# Patient Record
Sex: MALE | Race: WHITE | NOT HISPANIC OR LATINO | Employment: OTHER | URBAN - METROPOLITAN AREA
[De-identification: names, ages, dates, MRNs, and addresses within clinical notes are randomized per-mention and may not be internally consistent; named-entity substitution may affect disease eponyms.]

---

## 2018-08-13 ENCOUNTER — OFFICE VISIT (OUTPATIENT)
Dept: INTERNAL MEDICINE | Facility: CLINIC | Age: 68
End: 2018-08-13

## 2018-08-13 VITALS
OXYGEN SATURATION: 99 % | DIASTOLIC BLOOD PRESSURE: 84 MMHG | SYSTOLIC BLOOD PRESSURE: 130 MMHG | HEART RATE: 84 BPM | WEIGHT: 180.8 LBS

## 2018-08-13 DIAGNOSIS — I10 ESSENTIAL HYPERTENSION: Primary | ICD-10-CM

## 2018-08-13 DIAGNOSIS — H91.93 BILATERAL HEARING LOSS, UNSPECIFIED HEARING LOSS TYPE: ICD-10-CM

## 2018-08-13 PROCEDURE — 90670 PCV13 VACCINE IM: CPT | Performed by: INTERNAL MEDICINE

## 2018-08-13 PROCEDURE — 99203 OFFICE O/P NEW LOW 30 MIN: CPT | Performed by: INTERNAL MEDICINE

## 2018-08-13 PROCEDURE — 90471 IMMUNIZATION ADMIN: CPT | Performed by: INTERNAL MEDICINE

## 2018-08-13 RX ORDER — VALSARTAN 80 MG/1
320 TABLET ORAL DAILY
COMMUNITY
End: 2018-08-13

## 2018-08-13 RX ORDER — HYDROCHLOROTHIAZIDE 25 MG/1
12.5 TABLET ORAL DAILY
COMMUNITY
End: 2019-02-04

## 2018-08-13 RX ORDER — IRBESARTAN AND HYDROCHLOROTHIAZIDE 300; 12.5 MG/1; MG/1
1 TABLET, FILM COATED ORAL DAILY
Qty: 30 TABLET | Refills: 5 | Status: SHIPPED | OUTPATIENT
Start: 2018-08-13 | End: 2019-01-09 | Stop reason: SDUPTHER

## 2018-08-13 NOTE — PROGRESS NOTES
Establish Care (Just [place] another state )    Subjective   Balta De La O is a 68 y.o. male is here today to [facility] .    History of Present Illness   Moved here from [place], after he retired . Lived there for 33 years.  Concerns re; meds.   On valatn, did not receive any recall letter.    Current Outpatient Prescriptions:   •  hydrochlorothiazide (HYDRODIURIL) 25 MG tablet, Take 12.5 mg by mouth Daily., Disp: , Rfl:   •  irbesartan-hydrochlorothiazide (AVALIDE) 300-12.5 MG tablet, Take 1 tablet by mouth Daily., Disp: 30 tablet, Rfl: 5      The following portions of the patient's history were reviewed and updated as appropriate: allergies, current medications, past family history, past medical history, past social history, past surgical history and problem list.    Review of Systems   Constitutional: Negative.  Negative for chills and fever.   HENT: Negative for ear discharge, ear pain, sinus pressure and sore throat.    Respiratory: Negative for cough, chest tightness and shortness of breath.    Cardiovascular: Negative for chest pain, palpitations and leg swelling.   Gastrointestinal: Negative for diarrhea, nausea and vomiting.   Musculoskeletal: Negative for arthralgias, back pain and myalgias.   Neurological: Negative for dizziness, syncope and headaches.   Psychiatric/Behavioral: Negative for confusion and sleep disturbance.       Objective   /84   Pulse 84   Wt 82 kg (180 lb 12.8 oz)   SpO2 99%   Physical Exam   Constitutional: He is oriented to person, place, and time. He appears well-developed and well-nourished.   HENT:   Head: Normocephalic and atraumatic.   Mouth/Throat: No oropharyngeal exudate.   Eyes: Pupils are equal, round, and reactive to light. Conjunctivae are normal.   Neck: Neck supple. No thyromegaly present.   Cardiovascular: Normal rate, regular rhythm and intact distal pulses.    Pulmonary/Chest: Effort normal and breath sounds normal.   Abdominal: Soft. Bowel sounds are  normal. He exhibits no distension. There is no tenderness.   Musculoskeletal: He exhibits no edema.   Neurological: He is alert and oriented to person, place, and time. No cranial nerve deficit.   Skin: Skin is warm and dry.   Psychiatric: He has a normal mood and affect. Judgment normal.   Nursing note and vitals reviewed.        No results found for this or any previous visit.          Assessment/Plan   Diagnoses and all orders for this visit:    Essential hypertension  -     irbesartan-hydrochlorothiazide (AVALIDE) 300-12.5 MG tablet; Take 1 tablet by mouth Daily.    Bilateral hearing loss, unspecified hearing loss type  -     Ambulatory Referral to Audiology    Other orders  -     Discontinue: valsartan (DIOVAN) 80 MG tablet; Take 320 mg by mouth Daily.  -     hydrochlorothiazide (HYDRODIURIL) 25 MG tablet; Take 12.5 mg by mouth Daily.  -     Pneumococcal Conjugate Vaccine 13-Valent All    hold plain hctz and add if needed.  Start avalide.             Return in about 6 weeks (around 9/24/2018) for Next scheduled follow up.

## 2018-09-04 ENCOUNTER — TELEPHONE (OUTPATIENT)
Dept: INTERNAL MEDICINE | Facility: CLINIC | Age: 68
End: 2018-09-04

## 2018-09-04 NOTE — TELEPHONE ENCOUNTER
PATIENT WOULD LIKE HIS AUDIOLOGY REFERRAL TO BE WITH [facility] IN [place]

## 2018-09-18 ENCOUNTER — OFFICE VISIT (OUTPATIENT)
Dept: INTERNAL MEDICINE | Facility: CLINIC | Age: 68
End: 2018-09-18

## 2018-09-18 VITALS
BODY MASS INDEX: 26.51 KG/M2 | RESPIRATION RATE: 16 BRPM | SYSTOLIC BLOOD PRESSURE: 128 MMHG | HEART RATE: 71 BPM | DIASTOLIC BLOOD PRESSURE: 80 MMHG | WEIGHT: 179 LBS | HEIGHT: 69 IN | OXYGEN SATURATION: 98 %

## 2018-09-18 DIAGNOSIS — E78.5 DYSLIPIDEMIA: ICD-10-CM

## 2018-09-18 DIAGNOSIS — I10 ESSENTIAL HYPERTENSION: Primary | ICD-10-CM

## 2018-09-18 DIAGNOSIS — E55.9 VITAMIN D DEFICIENCY: ICD-10-CM

## 2018-09-18 LAB
25(OH)D3 SERPL-MCNC: 20.8 NG/ML
ALBUMIN SERPL-MCNC: 4.31 G/DL (ref 3.2–4.8)
ALBUMIN/GLOB SERPL: 1.7 G/DL (ref 1.5–2.5)
ALP SERPL-CCNC: 75 U/L (ref 25–100)
ALT SERPL W P-5'-P-CCNC: 19 U/L (ref 7–40)
ANION GAP SERPL CALCULATED.3IONS-SCNC: 12 MMOL/L (ref 3–11)
ARTICHOKE IGE QN: 112 MG/DL (ref 0–130)
AST SERPL-CCNC: 22 U/L (ref 0–33)
BILIRUB SERPL-MCNC: 0.7 MG/DL (ref 0.3–1.2)
BUN BLD-MCNC: 13 MG/DL (ref 9–23)
BUN/CREAT SERPL: 14.8 (ref 7–25)
CALCIUM SPEC-SCNC: 8.9 MG/DL (ref 8.7–10.4)
CHLORIDE SERPL-SCNC: 103 MMOL/L (ref 99–109)
CHOLEST SERPL-MCNC: 179 MG/DL (ref 0–200)
CO2 SERPL-SCNC: 26 MMOL/L (ref 20–31)
CREAT BLD-MCNC: 0.88 MG/DL (ref 0.6–1.3)
GFR SERPL CREATININE-BSD FRML MDRD: 87 ML/MIN/1.73
GLOBULIN UR ELPH-MCNC: 2.5 GM/DL
GLUCOSE BLD-MCNC: 76 MG/DL (ref 70–100)
HDLC SERPL-MCNC: 43 MG/DL (ref 40–60)
POTASSIUM BLD-SCNC: 3.9 MMOL/L (ref 3.5–5.5)
PROT SERPL-MCNC: 6.8 G/DL (ref 5.7–8.2)
SODIUM BLD-SCNC: 141 MMOL/L (ref 132–146)
TRIGL SERPL-MCNC: 220 MG/DL (ref 0–150)
TSH SERPL DL<=0.05 MIU/L-ACNC: 0.71 MIU/ML (ref 0.35–5.35)

## 2018-09-18 PROCEDURE — 82306 VITAMIN D 25 HYDROXY: CPT | Performed by: INTERNAL MEDICINE

## 2018-09-18 PROCEDURE — 84443 ASSAY THYROID STIM HORMONE: CPT | Performed by: INTERNAL MEDICINE

## 2018-09-18 PROCEDURE — 80061 LIPID PANEL: CPT | Performed by: INTERNAL MEDICINE

## 2018-09-18 PROCEDURE — 99213 OFFICE O/P EST LOW 20 MIN: CPT | Performed by: INTERNAL MEDICINE

## 2018-09-18 PROCEDURE — 80053 COMPREHEN METABOLIC PANEL: CPT | Performed by: INTERNAL MEDICINE

## 2018-09-18 NOTE — PROGRESS NOTES
"Hypertension (6 wk fu)    Subjective   Kendell De La O is a 68 y.o. male is here today for follow-up.    History of Present Illness   Here for a follow up on his HTN,started on Avalide at last visit and dlp. Fasting for labs- this afternoon.  Denies any side effects on the avalide.  HAs not been monitoring his BP.    Current Outpatient Prescriptions:   •  hydrochlorothiazide (HYDRODIURIL) 25 MG tablet, Take 12.5 mg by mouth Daily., Disp: , Rfl:   •  irbesartan-hydrochlorothiazide (AVALIDE) 300-12.5 MG tablet, Take 1 tablet by mouth Daily., Disp: 30 tablet, Rfl: 5      The following portions of the patient's history were reviewed and updated as appropriate: allergies, current medications, past family history, past medical history, past social history, past surgical history and problem list.    Review of Systems   Constitutional: Negative.  Negative for chills and fever.   HENT: Negative for ear discharge, ear pain, sinus pressure and sore throat.    Respiratory: Negative for cough, chest tightness and shortness of breath.    Cardiovascular: Negative for chest pain, palpitations and leg swelling.   Gastrointestinal: Negative for diarrhea, nausea and vomiting.   Musculoskeletal: Negative for arthralgias, back pain and myalgias.   Neurological: Negative for dizziness, syncope and headaches.   Psychiatric/Behavioral: Negative for confusion and sleep disturbance.       Objective   /80   Pulse 71   Resp 16   Ht 175.3 cm (69\")   Wt 81.2 kg (179 lb)   SpO2 98%   BMI 26.43 kg/m²   Physical Exam   Constitutional: He is oriented to person, place, and time. He appears well-developed and well-nourished.   HENT:   Head: Normocephalic and atraumatic.   Mouth/Throat: No oropharyngeal exudate.   Eyes: Pupils are equal, round, and reactive to light. Conjunctivae are normal.   Neck: Neck supple. No thyromegaly present.   Cardiovascular: Normal rate and regular rhythm.    Pulmonary/Chest: Effort normal and breath sounds " normal.   Abdominal: Soft. Bowel sounds are normal. He exhibits no distension. There is no tenderness.   Musculoskeletal: He exhibits no edema.   Neurological: He is alert and oriented to person, place, and time. No cranial nerve deficit.   Skin: Skin is warm and dry.   Psychiatric: He has a normal mood and affect. Judgment normal.   Nursing note and vitals reviewed.        Results for orders placed or performed in visit on 09/18/18   Comprehensive Metabolic Panel   Result Value Ref Range    Glucose 76 70 - 100 mg/dL    BUN 13 9 - 23 mg/dL    Creatinine 0.88 0.60 - 1.30 mg/dL    Sodium 141 132 - 146 mmol/L    Potassium 3.9 3.5 - 5.5 mmol/L    Chloride 103 99 - 109 mmol/L    CO2 26.0 20.0 - 31.0 mmol/L    Calcium 8.9 8.7 - 10.4 mg/dL    Total Protein 6.8 5.7 - 8.2 g/dL    Albumin 4.31 3.20 - 4.80 g/dL    ALT (SGPT) 19 7 - 40 U/L    AST (SGOT) 22 0 - 33 U/L    Alkaline Phosphatase 75 25 - 100 U/L    Total Bilirubin 0.7 0.3 - 1.2 mg/dL    eGFR Non African Amer 87 >60 mL/min/1.73    Globulin 2.5 gm/dL    A/G Ratio 1.7 1.5 - 2.5 g/dL    BUN/Creatinine Ratio 14.8 7.0 - 25.0    Anion Gap 12.0 (H) 3.0 - 11.0 mmol/L   Lipid Panel   Result Value Ref Range    Total Cholesterol 179 0 - 200 mg/dL    Triglycerides 220 (H) 0 - 150 mg/dL    HDL Cholesterol 43 40 - 60 mg/dL    LDL Cholesterol  112 0 - 130 mg/dL   TSH   Result Value Ref Range    TSH 0.712 0.350 - 5.350 mIU/mL   Vitamin D 25 Hydroxy   Result Value Ref Range    25 Hydroxy, Vitamin D 20.8 ng/ml             Assessment/Plan   Diagnoses and all orders for this visit:    Essential hypertension  -     Comprehensive Metabolic Panel    Dyslipidemia  -     Lipid Panel  -     TSH    Vitamin D deficiency  -     Vitamin D 25 Hydroxy      Check fasting labs.  Continue avalide at current dose.           Return in about 6 months (around 3/18/2019) for Medicare Wellness.

## 2019-01-09 ENCOUNTER — TELEPHONE (OUTPATIENT)
Dept: INTERNAL MEDICINE | Facility: CLINIC | Age: 69
End: 2019-01-09

## 2019-01-09 DIAGNOSIS — I10 ESSENTIAL HYPERTENSION: ICD-10-CM

## 2019-01-09 RX ORDER — IRBESARTAN AND HYDROCHLOROTHIAZIDE 300; 12.5 MG/1; MG/1
TABLET, FILM COATED ORAL
Qty: 30 TABLET | Refills: 4 | Status: SHIPPED | OUTPATIENT
Start: 2019-01-09 | End: 2019-01-09 | Stop reason: SDUPTHER

## 2019-01-09 RX ORDER — IRBESARTAN AND HYDROCHLOROTHIAZIDE 300; 12.5 MG/1; MG/1
1 TABLET, FILM COATED ORAL DAILY
Qty: 90 TABLET | Refills: 1 | Status: SHIPPED | OUTPATIENT
Start: 2019-01-09 | End: 2019-02-04

## 2019-02-04 ENCOUNTER — OFFICE VISIT (OUTPATIENT)
Dept: FAMILY MEDICINE CLINIC | Facility: CLINIC | Age: 69
End: 2019-02-04

## 2019-02-04 VITALS
BODY MASS INDEX: 27.7 KG/M2 | HEART RATE: 72 BPM | SYSTOLIC BLOOD PRESSURE: 134 MMHG | OXYGEN SATURATION: 99 % | DIASTOLIC BLOOD PRESSURE: 82 MMHG | HEIGHT: 69 IN | WEIGHT: 187 LBS

## 2019-02-04 DIAGNOSIS — Z12.5 SCREENING FOR PROSTATE CANCER: ICD-10-CM

## 2019-02-04 DIAGNOSIS — Z23 NEED FOR TDAP VACCINATION: ICD-10-CM

## 2019-02-04 DIAGNOSIS — Z11.59 NEED FOR HEPATITIS C SCREENING TEST: ICD-10-CM

## 2019-02-04 DIAGNOSIS — Z13.220 SCREENING FOR LIPOID DISORDERS: ICD-10-CM
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
## 2019-02-04 DIAGNOSIS — K64.0 GRADE I HEMORRHOIDS: ICD-10-CM

## 2019-02-04 DIAGNOSIS — I10 ESSENTIAL HYPERTENSION: Primary | ICD-10-CM

## 2019-02-04 LAB
ARTICHOKE IGE QN: 116 MG/DL (ref 0–130)
CHOLEST SERPL-MCNC: 190 MG/DL (ref 0–200)
HCV AB SER DONR QL: NORMAL
HDLC SERPL-MCNC: 38 MG/DL (ref 40–60)
PSA SERPL-MCNC: 0.45 NG/ML (ref 0–4)
TRIGL SERPL-MCNC: 595 MG/DL (ref 0–150)

## 2019-02-04 PROCEDURE — G0103 PSA SCREENING: HCPCS | Performed by: NURSE PRACTITIONER

## 2019-02-04 PROCEDURE — 80061 LIPID PANEL: CPT | Performed by: NURSE PRACTITIONER

## 2019-02-04 PROCEDURE — 36415 COLL VENOUS BLD VENIPUNCTURE: CPT | Performed by: NURSE PRACTITIONER

## 2019-02-04 PROCEDURE — 86803 HEPATITIS C AB TEST: CPT | Performed by: NURSE PRACTITIONER

## 2019-02-04 PROCEDURE — 99203 OFFICE O/P NEW LOW 30 MIN: CPT | Performed by: NURSE PRACTITIONER

## 2019-02-04 PROCEDURE — 90715 TDAP VACCINE 7 YRS/> IM: CPT | Performed by: NURSE PRACTITIONER

## 2019-02-04 PROCEDURE — 90471 IMMUNIZATION ADMIN: CPT | Performed by: NURSE PRACTITIONER

## 2019-02-04 RX ORDER — LOSARTAN POTASSIUM AND HYDROCHLOROTHIAZIDE 25; 100 MG/1; MG/1
1 TABLET ORAL DAILY
Qty: 90 TABLET | Refills: 1 | Status: SHIPPED | OUTPATIENT
Start: 2019-02-04 | End: 2019-09-26

## 2019-02-04 RX ORDER — POLYETHYLENE GLYCOL 3350 17 G/17G
17 POWDER, FOR SOLUTION ORAL DAILY
Qty: 100 EACH | Refills: 11 | Status: SHIPPED | OUTPATIENT
Start: 2019-02-04 | End: 2019-09-26

## 2019-02-04 NOTE — PROGRESS NOTES
Kendell De La O presents today to establish care.    Chief Complaint   Patient presents with   • Establish Care   • Hypertension        HPI   Kendell presents to establish care. Recently moved from [place], [place].  Recently he received a letter saying his blood pressure medicine was recalled.    Hypertension  Does not check at home. This is a chronic issue well controlled with BP meds but recalled. Denies chest pain or tightness, diaphoresis, headache with visual changes, shortness of breath, or change in mental status.    Hemorrhoids  Reports use of Preparation H twice daily.  Without Preparation H he strains to void and has internal hemorrhoids.    Dog bite  Bit by friends dog last week, broke skin but did not seek medical care until now. No s/s of infection. Dog did not have rabies to his knowledge.    H/O melanoma  [facility] skin clinic, Tammie squires MD Dermatologist. Would like to remain pt in [place] D[facility] even though he no longer lives there.    Health Maintenance:  -Sees dentist and opthalmology regularly.  -Sexually active with spouse.  -Use of seatbelt 100% of the time  -Smoke/CO detector working in home  -Nonsmoker  -Vaccines: needs Tdap (will receive today due to dog bite) and Shingrix. He denies Shingrix because insurance does not cover.  -Other providers: Tammie Squires, Dermatologist in [place]. He would like to retain Derm in [place] because he is pleased with the care he receives.    Review of Systems   Constitutional: Negative for activity change, fatigue, unexpected weight gain and unexpected weight loss.   HENT: Positive for hearing loss (hearing aid on right side). Negative for congestion.    Eyes: Negative for blurred vision (wears glasses) and visual disturbance.   Respiratory: Negative for chest tightness, shortness of breath and wheezing.    Cardiovascular: Negative for chest pain, palpitations and leg swelling.   Gastrointestinal: Positive for anal bleeding (intermittent due to hemorrhoids). Negative for  blood in stool, constipation, diarrhea, nausea, vomiting and GERD.   Endocrine: Negative for cold intolerance and heat intolerance.   Genitourinary: Negative for frequency, hematuria, nocturia and erectile dysfunction.   Musculoskeletal: Negative for arthralgias.   Skin: Negative for color change.   Allergic/Immunologic: Negative for immunocompromised state.   Neurological: Negative for dizziness, light-headedness, headache and confusion.   Hematological: Does not bruise/bleed easily.   Psychiatric/Behavioral: Negative for depressed mood and stress. The patient is not nervous/anxious.         Past Medical History:   Diagnosis Date   • Hypertension    • Kidney stones    • Melanoma (CMS/HCC)         Past Surgical History:   Procedure Laterality Date   • SKIN CANCER EXCISION          Family History   Problem Relation Age of Onset   • Liver disease Father    • Hypertension Mother         Social History     Socioeconomic History   • Marital status:      Spouse name: Not on file   • Number of children: 2   • Years of education: Not on file   • Highest education level: Not on file   Social Needs   • Financial resource strain: Not on file   • Food insecurity - worry: Not on file   • Food insecurity - inability: Not on file   • Transportation needs - medical: Not on file   • Transportation needs - non-medical: Not on file   Occupational History   • Not on file   Tobacco Use   • Smoking status: Never Smoker   • Smokeless tobacco: Never Used   Substance and Sexual Activity   • Alcohol use: No   • Drug use: No   • Sexual activity: Yes     Partners: Female     Birth control/protection: None   Other Topics Concern   • Not on file   Social History Narrative   • Not on file        Current Outpatient Medications   Medication Sig Dispense Refill   • phenylephrine-mineral oil-petrolatum 0.25-14-74.9 % ointment hemorrhoidal ointment Insert  into the rectum.     • docusate sodium (COLACE) 50 MG capsule Take 2 capsules by mouth 3  "(Three) Times a Day As Needed for Constipation. 90 capsule 2   • hydrocortisone (ANUSOL-HC) 2.5 % rectal cream Insert  into the rectum 2 (Two) Times a Day. 28.35 g 11   • losartan-hydrochlorothiazide (HYZAAR) 100-25 MG per tablet Take 1 tablet by mouth Daily. 90 tablet 1   • polyethylene glycol (MIRALAX) packet Take 17 g by mouth Daily. 100 each 11     No current facility-administered medications for this visit.        Allergies   Allergen Reactions   • Penicillins Rash        Visit Vitals  /82 (BP Location: Left arm, Patient Position: Sitting, Cuff Size: Adult)   Pulse 72   Ht 175.3 cm (69\")   Wt 84.8 kg (187 lb)   SpO2 99%   BMI 27.62 kg/m²        Physical Exam   Constitutional: He is oriented to person, place, and time. Vital signs are normal. He appears well-developed and well-nourished.   HENT:   Head: Normocephalic.   Right Ear: Tympanic membrane, external ear and ear canal normal. Decreased hearing (wears aid) is noted.   Left Ear: Hearing, tympanic membrane, external ear and ear canal normal.   Nose: Nose normal.   Mouth/Throat: Uvula is midline and oropharynx is clear and moist.   Eyes: Lids are normal.   Neck: Full passive range of motion without pain. No JVD present. Carotid bruit is not present. No thyromegaly present.   Cardiovascular: Normal rate, regular rhythm, normal heart sounds, intact distal pulses and normal pulses.   Pulmonary/Chest: Effort normal and breath sounds normal.   Abdominal: Soft. Bowel sounds are normal.   Musculoskeletal: Normal range of motion.   Lymphadenopathy:        Head (right side): No submental, no submandibular, no tonsillar, no preauricular, no posterior auricular and no occipital adenopathy present.        Head (left side): No submental, no submandibular, no tonsillar, no preauricular, no posterior auricular and no occipital adenopathy present.     He has no cervical adenopathy.   Neurological: He is alert and oriented to person, place, and time. GCS eye subscore " is 4. GCS verbal subscore is 5. GCS motor subscore is 6.   Skin: Skin is warm, dry and intact.   Psychiatric: He has a normal mood and affect. His speech is normal and behavior is normal. Judgment and thought content normal.   Nursing note and vitals reviewed.       Results for orders placed or performed in visit on 09/18/18   Comprehensive Metabolic Panel   Result Value Ref Range    Glucose 76 70 - 100 mg/dL    BUN 13 9 - 23 mg/dL    Creatinine 0.88 0.60 - 1.30 mg/dL    Sodium 141 132 - 146 mmol/L    Potassium 3.9 3.5 - 5.5 mmol/L    Chloride 103 99 - 109 mmol/L    CO2 26.0 20.0 - 31.0 mmol/L    Calcium 8.9 8.7 - 10.4 mg/dL    Total Protein 6.8 5.7 - 8.2 g/dL    Albumin 4.31 3.20 - 4.80 g/dL    ALT (SGPT) 19 7 - 40 U/L    AST (SGOT) 22 0 - 33 U/L    Alkaline Phosphatase 75 25 - 100 U/L    Total Bilirubin 0.7 0.3 - 1.2 mg/dL    eGFR Non African Amer 87 >60 mL/min/1.73    Globulin 2.5 gm/dL    A/G Ratio 1.7 1.5 - 2.5 g/dL    BUN/Creatinine Ratio 14.8 7.0 - 25.0    Anion Gap 12.0 (H) 3.0 - 11.0 mmol/L   Lipid Panel   Result Value Ref Range    Total Cholesterol 179 0 - 200 mg/dL    Triglycerides 220 (H) 0 - 150 mg/dL    HDL Cholesterol 43 40 - 60 mg/dL    LDL Cholesterol  112 0 - 130 mg/dL   TSH   Result Value Ref Range    TSH 0.712 0.350 - 5.350 mIU/mL   Vitamin D 25 Hydroxy   Result Value Ref Range    25 Hydroxy, Vitamin D 20.8 ng/ml        Assessment/Plan  Kendell was seen today for establish care and hypertension.    Diagnoses and all orders for this visit:    Essential hypertension  -     losartan-hydrochlorothiazide (HYZAAR) 100-25 MG per tablet; Take 1 tablet by mouth Daily.  -Instructed Kendell to keep log of blood pressures at home over the next 2 weeks.  -Educated on DASH diet and implementing an exercise regimen.  -Instructed to seek emergent treatment if chest pain or tightness, diaphoresis, headache with visual changes, shortness of breath, or change in mental status occur.    Grade I hemorrhoids  -      docusate sodium (COLACE) 50 MG capsule; Take 2 capsules by mouth 3 (Three) Times a Day As Needed for Constipation.  -     hydrocortisone (ANUSOL-HC) 2.5 % rectal cream; Insert  into the rectum 2 (Two) Times a Day.  -     polyethylene glycol (MIRALAX) packet; Take 17 g by mouth Daily.  -Educated on daily bowel regimen. Increase water intake.     Dog bite, initial encounter/Need for Tdap vaccination  -     Tdap Vaccine Greater Than or Equal To 8yo IM  -No bite marks from dog. Will follow up if needed.  -Educated on s/s of infection.    Screening for prostate cancer  -     PSA SCREENING; Future  Screening for lipoid disorders  -     Lipid panel; Future  Need for hepatitis C screening test  -     Hepatitis C Antibody; Future      Return in about 3 months (around 5/4/2019) for Medicare Wellness.

## 2019-02-04 NOTE — PROGRESS NOTES
"QUICK REFERENCE INFORMATION:  The ABCs of the Annual Wellness Visit  Kendell De La O is a 69 y.o. male presenting forMedicare Subsequent Wellness visit and  No chief complaint on file.  .     Medicare Subsequent Wellness Visit    No chief complaint on file.       HPI     Review of Systems      Immunization History   Administered Date(s) Administered   • Pneumococcal Conjugate 13-Valent (PCV13) 08/13/2018        The following portions of the patient's history were reviewed and updated as appropriate: {history reviewed:20406::\"allergies\",\"current medications\",\"past family history\",\"past medical history\",\"past social history\",\"past surgical history\",\"problem list\"}.      Objective    There were no vitals taken for this visit.   Physical Exam       HEALTH RISK ASSESSMENT    1950    Recent Hospitalizations:  No hospitalization(s) within the last year..      Current Medical Providers:  Patient Care Team:  Provider, No Known as PCP - General      Smoking Status:  Social History     Tobacco Use   Smoking Status Never Smoker   Smokeless Tobacco Never Used       Alcohol Consumption:  Social History     Substance and Sexual Activity   Alcohol Use No       Depression Screen:   No flowsheet data found.    Health Habits and Functional and Cognitive Screening:  No flowsheet data found.      Does the patient have evidence of cognitive impairment? No    Aspirin use counseling? Start ASA 81 mg daily       Recent Lab Results:  CMP:  Lab Results   Component Value Date    BUN 13 09/18/2018    CREATININE 0.88 09/18/2018    EGFRIFNONA 87 09/18/2018    BCR 14.8 09/18/2018     09/18/2018    K 3.9 09/18/2018    CO2 26.0 09/18/2018    CALCIUM 8.9 09/18/2018    ALBUMIN 4.31 09/18/2018    BILITOT 0.7 09/18/2018    ALKPHOS 75 09/18/2018    AST 22 09/18/2018    ALT 19 09/18/2018     Lipid Panel:  Lab Results   Component Value Date    CHOL 179 09/18/2018    TRIG 220 (H) 09/18/2018    HDL 43 09/18/2018     HbA1c:       Visual Acuity:  No " exam data present    Age-appropriate Screening Schedule:  Refer to the list below for future screening recommendations based on patient's age, sex and/or medical conditions. Orders for these recommended tests are listed in the plan section. The patient has been provided with a written plan.    Health Maintenance   Topic Date Due   • TDAP/TD VACCINES (1 - Tdap) 02/04/1971   • ZOSTER VACCINE (1 of 2) 02/04/2002   • INFLUENZA VACCINE  08/01/2018   • PNEUMOCOCCAL VACCINES (65+ LOW/MEDIUM RISK) (2 of 2 - PPSV23) 08/13/2019   • COLONOSCOPY  08/10/2026          Advance Care Planning:  {Advance Directive Status:06617}    Identification of Risk Factors:  Risk factors include: {MC; WELLNESS RISK FACTORS:23991}.    Compared to one year ago, the patient feels his physical health is {better worse same:74219}.  Compared to one year ago, the patient feels his mental health is {better worse same:31158}.  Reviewed use of high risk medication in the elderly: {Response; yes/no/na:63}  Reviewed for potential of harmful drug interactions in the elderly: {Response; yes/no/na:63}    Assessment/Plan  Diagnoses and all orders for this visit:    Medicare annual wellness visit, subsequent    Screening for prostate cancer  -     PSA SCREENING; Future    Screening for lipoid disorders  -     Lipid panel; Future    Need for hepatitis C screening test  -     Hepatitis C Antibody; Future          Patient Self-Management and Personalized Health Advice  The patient has been provided with information about: diet, exercise, weight management and prevention of cardiac or vascular disease and preventive services including:   · {PLAN:41965}.      Follow Up:  No Follow-up on file.     An After Visit Summary and PPPS with all of these plans were given to the patient.

## 2019-02-04 NOTE — PATIENT INSTRUCTIONS
"Food Choices to Lower Your Triglycerides  Triglycerides are a type of fat in your blood. High levels of triglycerides can increase the risk of heart disease and stroke. If your triglyceride levels are high, the foods you eat and your eating habits are very important. Choosing the right foods can help lower your triglycerides.  What general guidelines do I need to follow?  · Lose weight if you are overweight.  · Limit or avoid alcohol.  · Fill one half of your plate with vegetables and green salads.  · Limit fruit to two servings a day. Choose fruit instead of juice.  · Make one fourth of your plate whole grains. Look for the word \"whole\" as the first word in the ingredient list.  · Fill one fourth of your plate with lean protein foods.  · Enjoy fatty fish (such as salmon, mackerel, sardines, and tuna) three times a week.  · Choose healthy fats.  · Limit foods high in starch and sugar.  · Eat more home-cooked food and less restaurant, buffet, and fast food.  · Limit fried foods.  · Cook foods using methods other than frying.  · Limit saturated fats.  · Check ingredient lists to avoid foods with partially hydrogenated oils (trans fats) in them.  What foods can I eat?  Grains  Whole grains, such as whole wheat or whole grain breads, crackers, cereals, and pasta. Unsweetened oatmeal, bulgur, barley, quinoa, or brown rice. Corn or whole wheat flour tortillas.  Vegetables  Fresh or frozen vegetables (raw, steamed, roasted, or grilled). Green salads.  Fruits  All fresh, canned (in natural juice), or frozen fruits.  Meat and Other Protein Products  Ground beef (85% or leaner), grass-fed beef, or beef trimmed of fat. Skinless chicken or turkey. Ground chicken or turkey. Pork trimmed of fat. All fish and seafood. Eggs. Dried beans, peas, or lentils. Unsalted nuts or seeds. Unsalted canned or dry beans.  Dairy  Low-fat dairy products, such as skim or 1% milk, 2% or reduced-fat cheeses, low-fat ricotta or cottage cheese, or " plain low-fat yogurt.  Fats and Oils  Tub margarines without trans fats. Light or reduced-fat mayonnaise and salad dressings. Avocado. Safflower, olive, or canola oils. Natural peanut or almond butter.  The items listed above may not be a complete list of recommended foods or beverages. Contact your dietitian for more options.  What foods are not recommended?  Grains  White bread. White pasta. White rice. Cornbread. Bagels, pastries, and croissants. Crackers that contain trans fat.  Vegetables  White potatoes. Corn. Creamed or fried vegetables. Vegetables in a cheese sauce.  Fruits  Dried fruits. Canned fruit in light or heavy syrup. Fruit juice.  Meat and Other Protein Products  Fatty cuts of meat. Ribs, chicken wings, tan, sausage, bologna, salami, chitterlings, fatback, hot dogs, bratwurst, and packaged luncheon meats.  Dairy  Whole or 2% milk, cream, half-and-half, and cream cheese. Whole-fat or sweetened yogurt. Full-fat cheeses. Nondairy creamers and whipped toppings. Processed cheese, cheese spreads, or cheese curds.  Sweets and Desserts  Corn syrup, sugars, honey, and molasses. Candy. Jam and jelly. Syrup. Sweetened cereals. Cookies, pies, cakes, donuts, muffins, and ice cream.  Fats and Oils  Butter, stick margarine, lard, shortening, ghee, or tan fat. Coconut, palm kernel, or palm oils.  Beverages  Alcohol. Sweetened drinks (such as sodas, lemonade, and fruit drinks or punches).  The items listed above may not be a complete list of foods and beverages to avoid. Contact your dietitian for more information.  This information is not intended to replace advice given to you by your health care provider. Make sure you discuss any questions you have with your health care provider.  Document Released: 10/05/2005 Document Revised: 05/25/2017 Document Reviewed: 10/22/2014  "[employer] Interactive Patient Education © 2017 "[employer] Inc[employer]

## 2019-03-29 ENCOUNTER — OFFICE VISIT (OUTPATIENT)
Dept: FAMILY MEDICINE CLINIC | Facility: CLINIC | Age: 69
End: 2019-03-29

## 2019-03-29 ENCOUNTER — LAB (OUTPATIENT)
Dept: LAB | Facility: HOSPITAL | Age: 69
End: 2019-03-29

## 2019-03-29 VITALS
HEART RATE: 70 BPM | TEMPERATURE: 97.9 F | WEIGHT: 186 LBS | HEIGHT: 69 IN | SYSTOLIC BLOOD PRESSURE: 118 MMHG | BODY MASS INDEX: 27.55 KG/M2 | DIASTOLIC BLOOD PRESSURE: 74 MMHG

## 2019-03-29 DIAGNOSIS — Z13.0 SCREENING FOR DEFICIENCY ANEMIA: ICD-10-CM

## 2019-03-29 DIAGNOSIS — K64.9 HEMORRHOIDS, UNSPECIFIED HEMORRHOID TYPE: ICD-10-CM

## 2019-03-29 DIAGNOSIS — Z13.1 SCREENING FOR DIABETES MELLITUS: ICD-10-CM

## 2019-03-29 DIAGNOSIS — C43.9 MALIGNANT MELANOMA, UNSPECIFIED SITE (HCC): ICD-10-CM

## 2019-03-29 DIAGNOSIS — Z00.00 WELCOME TO MEDICARE PREVENTIVE VISIT: Primary | ICD-10-CM

## 2019-03-29 DIAGNOSIS — E78.2 ELEVATED CHOLESTEROL WITH HIGH TRIGLYCERIDES: ICD-10-CM

## 2019-03-29 DIAGNOSIS — I10 ESSENTIAL HYPERTENSION: ICD-10-CM

## 2019-03-29 PROBLEM — H91.93 DECREASED HEARING OF BOTH EARS: Status: ACTIVE | Noted: 2019-03-29

## 2019-03-29 PROBLEM — Z97.4 WEARS HEARING AID: Status: ACTIVE | Noted: 2019-03-29

## 2019-03-29 LAB
ALBUMIN SERPL-MCNC: 4.23 G/DL (ref 3.2–4.8)
ALBUMIN/GLOB SERPL: 1.6 G/DL (ref 1.5–2.5)
ALP SERPL-CCNC: 66 U/L (ref 25–100)
ALT SERPL W P-5'-P-CCNC: 22 U/L (ref 7–40)
ANION GAP SERPL CALCULATED.3IONS-SCNC: 9 MMOL/L (ref 3–11)
ARTICHOKE IGE QN: 149 MG/DL (ref 0–130)
AST SERPL-CCNC: 22 U/L (ref 0–33)
BASOPHILS # BLD AUTO: 0.04 10*3/MM3 (ref 0–0.2)
BASOPHILS NFR BLD AUTO: 0.9 % (ref 0–1)
BILIRUB SERPL-MCNC: 0.5 MG/DL (ref 0.3–1.2)
BUN BLD-MCNC: 19 MG/DL (ref 9–23)
BUN/CREAT SERPL: 19 (ref 7–25)
CALCIUM SPEC-SCNC: 9.1 MG/DL (ref 8.7–10.4)
CHLORIDE SERPL-SCNC: 102 MMOL/L (ref 99–109)
CHOLEST SERPL-MCNC: 201 MG/DL (ref 0–200)
CO2 SERPL-SCNC: 28 MMOL/L (ref 20–31)
CREAT BLD-MCNC: 1 MG/DL (ref 0.6–1.3)
DEPRECATED RDW RBC AUTO: 41.7 FL (ref 37–54)
EOSINOPHIL # BLD AUTO: 0.13 10*3/MM3 (ref 0–0.3)
EOSINOPHIL NFR BLD AUTO: 3 % (ref 0–3)
ERYTHROCYTE [DISTWIDTH] IN BLOOD BY AUTOMATED COUNT: 12.6 % (ref 11.3–14.5)
GFR SERPL CREATININE-BSD FRML MDRD: 75 ML/MIN/1.73
GLOBULIN UR ELPH-MCNC: 2.6 GM/DL
GLUCOSE BLD-MCNC: 101 MG/DL (ref 70–100)
HCT VFR BLD AUTO: 44.1 % (ref 38.9–50.9)
HDLC SERPL-MCNC: 46 MG/DL (ref 40–60)
HGB BLD-MCNC: 15.3 G/DL (ref 13.1–17.5)
IMM GRANULOCYTES # BLD AUTO: 0.01 10*3/MM3 (ref 0–0.05)
IMM GRANULOCYTES NFR BLD AUTO: 0.2 % (ref 0–0.6)
LYMPHOCYTES # BLD AUTO: 2.14 10*3/MM3 (ref 0.6–4.8)
LYMPHOCYTES NFR BLD AUTO: 49.4 % (ref 24–44)
MCH RBC QN AUTO: 32.1 PG (ref 27–31)
MCHC RBC AUTO-ENTMCNC: 34.7 G/DL (ref 32–36)
MCV RBC AUTO: 92.6 FL (ref 80–99)
MONOCYTES # BLD AUTO: 0.5 10*3/MM3 (ref 0–1)
MONOCYTES NFR BLD AUTO: 11.5 % (ref 0–12)
NEUTROPHILS # BLD AUTO: 1.52 10*3/MM3 (ref 1.5–8.3)
NEUTROPHILS NFR BLD AUTO: 35.2 % (ref 41–71)
PLATELET # BLD AUTO: 213 10*3/MM3 (ref 150–450)
PMV BLD AUTO: 10 FL (ref 6–12)
POTASSIUM BLD-SCNC: 3.9 MMOL/L (ref 3.5–5.5)
PROT SERPL-MCNC: 6.8 G/DL (ref 5.7–8.2)
RBC # BLD AUTO: 4.76 10*6/MM3 (ref 4.2–5.76)
SODIUM BLD-SCNC: 139 MMOL/L (ref 132–146)
TRIGL SERPL-MCNC: 133 MG/DL (ref 0–150)
WBC NRBC COR # BLD: 4.33 10*3/MM3 (ref 3.5–10.8)

## 2019-03-29 PROCEDURE — G0402 INITIAL PREVENTIVE EXAM: HCPCS | Performed by: NURSE PRACTITIONER

## 2019-03-29 PROCEDURE — 36415 COLL VENOUS BLD VENIPUNCTURE: CPT

## 2019-03-29 PROCEDURE — 85025 COMPLETE CBC W/AUTO DIFF WBC: CPT | Performed by: NURSE PRACTITIONER

## 2019-03-29 PROCEDURE — 80053 COMPREHEN METABOLIC PANEL: CPT | Performed by: NURSE PRACTITIONER

## 2019-03-29 PROCEDURE — G0403 EKG FOR INITIAL PREVENT EXAM: HCPCS | Performed by: NURSE PRACTITIONER

## 2019-03-29 PROCEDURE — 80061 LIPID PANEL: CPT | Performed by: NURSE PRACTITIONER

## 2019-03-29 NOTE — PROGRESS NOTES
"Initial Medicare Wellness Visit   The ABC's of the Annual Wellness Visit    Chief Complaint   Patient presents with   • Welcome To Medicare       HPI:  Kendell De La O, -1950, is a 69 y.o. male who presents for an Initial Medicare Wellness Visit.  No concerns today/    -Bowel regimen is good and hemorrhoids have resolved.    -HTN well controlled.     -Sees Dermatologist every 6 months for follow up of melanoma. Uses appropriate sun protection.    -Hx of PET scan and \"lot of other scans\" for previous calcification on lung that was of concern. Pulmonologist states this was a calcification from histoplasmosis as a child, no follow up needed.      Recent Hospitalizations:  No hospitalization(s) within the last year..    Current Medical Providers:  Patient Care Team:  Kaylee Trotter APRN as PCP - General (Nurse Practitioner)    Health Habits and Functional and Cognitive Screening and Depression Screening:  Functional & Cognitive Status 3/29/2019   Do you have difficulty preparing food and eating? No   Do you have difficulty bathing yourself, getting dressed or grooming yourself? No   Do you have difficulty using the toilet? No   Do you have difficulty moving around from place to place? No   Do you have trouble with steps or getting out of a bed or a chair? No   In the past year have you fallen or experienced a near fall? No   Current Diet Low Fat Diet   Dental Exam Up to date   Eye Exam Not up to date   Exercise (times per week) 5 times per week   Current Exercise Activities Include Cardiovasular Workout on Exercise Equipment   Do you need help using the phone?  No   Are you deaf or do you have serious difficulty hearing?  Yes   Do you need help with transportation? No   Do you need help shopping? No   Do you need help preparing meals?  No   Do you need help with housework?  No   Do you need help with laundry? No   Do you need help taking your medications? No   Do you need help managing money? No   Do you ever drive " or ride in a car without wearing a seat belt? No       Compared to one year ago, the patient feels his physical health is better and his mental health is better.    Depression Screen:  PHQ-2/PHQ-9 Depression Screening 3/29/2019   Little interest or pleasure in doing things 0   Feeling down, depressed, or hopeless 0   Total Score 0         Past Medical/Family/Social History:  The following portions of the patient's history were reviewed and updated as appropriate:   He  has a past medical history of Histoplasmosis, Hypertension, Kidney stones, and Melanoma (CMS/HCC).  He does not have any pertinent problems on file.  He  has a past surgical history that includes Skin cancer excision and Tonsillectomy.  His family history includes Hypertension in his mother; Liver disease in his father.  He  reports that he has never smoked. He has never used smokeless tobacco. He reports that he drinks about 0.6 oz of alcohol per week. He reports that he does not use drugs.  Current Outpatient Medications   Medication Sig Dispense Refill   • docusate sodium (COLACE) 50 MG capsule Take 2 capsules by mouth 3 (Three) Times a Day As Needed for Constipation. 90 capsule 2   • hydrocortisone (ANUSOL-HC) 2.5 % rectal cream Insert  into the rectum 2 (Two) Times a Day. 28.35 g 11   • losartan-hydrochlorothiazide (HYZAAR) 100-25 MG per tablet Take 1 tablet by mouth Daily. 90 tablet 1   • phenylephrine-mineral oil-petrolatum 0.25-14-74.9 % ointment hemorrhoidal ointment Insert  into the rectum.     • [facility] HEMORRHOIDAL 1-0.25-14.4-15 % cream INSERT INTO RECTUM TWO TIMES DAILY AS NEEDED FOR HEMORRHOIDS  3   • polyethylene glycol (MIRALAX) packet Take 17 g by mouth Daily. 100 each 11     No current facility-administered medications for this visit.      Current Outpatient Medications on File Prior to Visit   Medication Sig   • docusate sodium (COLACE) 50 MG capsule Take 2 capsules by mouth 3 (Three) Times a Day As Needed for Constipation.   •  "hydrocortisone (ANUSOL-HC) 2.5 % rectal cream Insert  into the rectum 2 (Two) Times a Day.   • losartan-hydrochlorothiazide (HYZAAR) 100-25 MG per tablet Take 1 tablet by mouth Daily.   • phenylephrine-mineral oil-petrolatum 0.25-14-74.9 % ointment hemorrhoidal ointment Insert  into the rectum.   • [facility] HEMORRHOIDAL 1-0.25-14.4-15 % cream INSERT INTO RECTUM TWO TIMES DAILY AS NEEDED FOR HEMORRHOIDS   • polyethylene glycol (MIRALAX) packet Take 17 g by mouth Daily.     No current facility-administered medications on file prior to visit.      He is allergic to penicillins..    Allergies   Allergen Reactions   • Penicillins Rash       Aspirin use counseling: Does not need ASA (and currently is not on it)    Current medication list contains no high risk medications.  No harmful drug interactions have been identified.     Family History   Problem Relation Age of Onset   • Liver disease Father    • Hypertension Mother        Social History     Tobacco Use   • Smoking status: Never Smoker   • Smokeless tobacco: Never Used   Substance Use Topics   • Alcohol use: Yes     Alcohol/week: 0.6 oz     Types: 1 Cans of beer per week     Comment: ocassional       Past Surgical History:   Procedure Laterality Date   • SKIN CANCER EXCISION     • TONSILLECTOMY         Patient Active Problem List   Diagnosis   • Decreased hearing of both ears   • Wears hearing aid       Review of Systems   HENT: Positive for congestion and postnasal drip.    Gastrointestinal: Negative for anal bleeding, blood in stool and rectal pain.        Internal hemorrhoids   Skin: Negative for color change.   All other systems reviewed and are negative.      Objective     Vitals:    03/29/19 0859   BP: 118/74   Pulse: 70   Temp: 97.9 °F (36.6 °C)   Weight: 84.4 kg (186 lb)   Height: 175.3 cm (69\")       Patient's Body mass index is 27.47 kg/m². BMI is within normal parameters. No follow-up required..      No exam data present    The patient has no evidence of " cognitve impairment.     Physical Exam   Constitutional: He is oriented to person, place, and time. Vital signs are normal. He appears well-developed and well-nourished. No distress.   HENT:   Head: Normocephalic.   Right Ear: Hearing, tympanic membrane, external ear and ear canal normal.   Left Ear: Hearing, tympanic membrane, external ear and ear canal normal.   Nose: Nose normal.   Mouth/Throat: Uvula is midline and oropharynx is clear and moist.   Eyes: Conjunctivae, EOM and lids are normal. Pupils are equal, round, and reactive to light. Right eye exhibits no discharge. Left eye exhibits no discharge. No scleral icterus.   Neck: Normal range of motion. No JVD present. Carotid bruit is not present. No thyromegaly present.   Cardiovascular: Normal rate, regular rhythm, normal heart sounds and intact distal pulses. Exam reveals no gallop and no friction rub.   No murmur heard.  Pulmonary/Chest: Effort normal and breath sounds normal. No respiratory distress.   Abdominal: Soft. Normal appearance, normal aorta and bowel sounds are normal. He exhibits no distension and no mass. There is no hepatosplenomegaly. There is no tenderness. There is no guarding, no CVA tenderness, no tenderness at McBurney's point and negative Jerez's sign. No hernia.   Genitourinary:   Genitourinary Comments: Declined PREM   Musculoskeletal: Normal range of motion. He exhibits no edema.   Lymphadenopathy:        Head (right side): No submental, no submandibular, no tonsillar, no preauricular, no posterior auricular and no occipital adenopathy present.        Head (left side): No submental, no submandibular, no tonsillar, no preauricular, no posterior auricular and no occipital adenopathy present.     He has no cervical adenopathy.   Neurological: He is alert and oriented to person, place, and time. He has normal strength. He displays normal reflexes. No sensory deficit. Coordination normal. GCS eye subscore is 4. GCS verbal subscore is 5.  GCS motor subscore is 6.   Skin: Skin is warm, dry and intact. Capillary refill takes less than 2 seconds. No rash noted.   Psychiatric: He has a normal mood and affect. His speech is normal and behavior is normal. Judgment and thought content normal. Cognition and memory are normal.   Mini cog 5/5   Nursing note and vitals reviewed.      Recent Lab Results:     Lab Results   Component Value Date    CHOL 201 (H) 03/29/2019    TRIG 133 03/29/2019    HDL 46 03/29/2019         Assessment/Plan   Age-appropriate Screening Schedule:  Refer to the list below for future screening recommendations based on patient's age, sex and/or medical conditions.      Health Maintenance   Topic Date Due   • ZOSTER VACCINE (1 of 2) 02/04/2002   • INFLUENZA VACCINE  08/01/2019   • PNEUMOCOCCAL VACCINES (65+ LOW/MEDIUM RISK) (2 of 2 - PPSV23) 08/13/2019   • LIPID PANEL  03/29/2020   • COLONOSCOPY  08/10/2026   • TDAP/TD VACCINES (2 - Td) 02/04/2029       Medicare Risks and Personalized Health Plan:  No concerns.      CMS-Preventive Services Quick Reference  Medicare Preventive Services Addressed:  No concerns.    Advance Care Planning:  Patient has an advance directive - a copy has not been provided. Have asked the patient to send this to us to add to record    ECG 12 Lead  Date/Time: 3/29/2019 10:13 AM  Performed by: Kaylee Trotter APRN  Authorized by: Kaylee Trotter APRN   Comparison: not compared with previous ECG   Rhythm: sinus rhythm  Rate: normal  Conduction: conduction normal  ST Segments: ST segments normal  T Waves: T waves normal  QRS axis: normal  Other: no other findings    Clinical impression: normal ECG          Diagnoses and all orders for this visit:    1. Welcome to Medicare preventive visit (Primary)  -     ECG 12 Lead  -No concerns today.  -Advised daily allergy medication, wearing a filtration mask and ear plugs when mowing grass to prevent allergy flare.    An After Visit Summary and PPPS with all of these plans were given  to the patient.      Kendell was seen today for welcome to medicare.    Diagnoses and all orders for this visit:    Essential hypertension  -Continue DASH diet and current medications as directed.  -Instructed Kendell to keep log of blood pressures at home   -Instructed to seek emergent treatment if chest pain or tightness, diaphoresis, headache with visual changes, shortness of breath, or change in mental status occur.    Hemorrhoids, unspecified hemorrhoid type  -Continue bowel regimen as directed. Hemorrhoids resolved.    Malignant melanoma, unspecified site (CMS/HCC)  -Remission. Continue regular follow ups with DERM      Follow Up:  Return in about 1 year (around 3/29/2020) for Medicare Wellness.

## 2019-03-29 NOTE — PATIENT INSTRUCTIONS
Medicare Wellness  Personal Prevention Plan of Service     Date of Office Visit:  2019  Encounter Provider:  JAIME Young  Place of Service:  [facility]  Patient Name: Kendell De La O  :  1950    As part of the Medicare Wellness portion of your visit today, we are providing you with this personalized preventive plan of services (PPPS). This plan is based upon recommendations of the [place] Preventive Services Task Force (USPSTF) and the Advisory Committee on Immunization Practices (ACIP).    This lists the preventive care services that should be considered, and provides dates of when you are due. Items listed as completed are up-to-date and do not require any further intervention.    Health Maintenance   Topic Date Due   • ZOSTER VACCINE (1 of 2) 2002   • MEDICARE ANNUAL WELLNESS  2018   • PNEUMOCOCCAL VACCINES (65+ LOW/MEDIUM RISK) (2 of 2 - PPSV23) 2019   • LIPID PANEL  2020   • COLONOSCOPY  08/10/2026   • TDAP/TD VACCINES (2 - Td) 2029   • HEPATITIS C SCREENING  Completed   • INFLUENZA VACCINE  Completed       Orders Placed This Encounter   Procedures   • ECG 12 Lead     Order Specific Question:   Reason for Exam:     Answer:   welcome to medicare visit       Return in about 1 year (around 3/29/2020) for Medicare Wellness.

## 2019-09-26 ENCOUNTER — OFFICE VISIT (OUTPATIENT)
Dept: FAMILY MEDICINE CLINIC | Facility: CLINIC | Age: 69
End: 2019-09-26

## 2019-09-26 ENCOUNTER — CONSULT (OUTPATIENT)
Dept: CARDIOLOGY | Facility: CLINIC | Age: 69
End: 2019-09-26

## 2019-09-26 VITALS
HEART RATE: 61 BPM | HEIGHT: 70 IN | SYSTOLIC BLOOD PRESSURE: 152 MMHG | WEIGHT: 177 LBS | BODY MASS INDEX: 25.34 KG/M2 | DIASTOLIC BLOOD PRESSURE: 82 MMHG

## 2019-09-26 VITALS
BODY MASS INDEX: 26.31 KG/M2 | TEMPERATURE: 97.7 F | WEIGHT: 177.6 LBS | HEIGHT: 69 IN | DIASTOLIC BLOOD PRESSURE: 60 MMHG | SYSTOLIC BLOOD PRESSURE: 112 MMHG

## 2019-09-26 DIAGNOSIS — I10 ESSENTIAL HYPERTENSION: ICD-10-CM

## 2019-09-26 DIAGNOSIS — R42 DIZZINESS: Primary | ICD-10-CM

## 2019-09-26 DIAGNOSIS — R94.31 ABNORMAL EKG: ICD-10-CM

## 2019-09-26 DIAGNOSIS — E78.2 MIXED HYPERLIPIDEMIA: ICD-10-CM

## 2019-09-26 DIAGNOSIS — D64.9 ANEMIA, UNSPECIFIED TYPE: ICD-10-CM

## 2019-09-26 DIAGNOSIS — E55.9 VITAMIN D DEFICIENCY: ICD-10-CM

## 2019-09-26 DIAGNOSIS — I49.3 PVC (PREMATURE VENTRICULAR CONTRACTION): ICD-10-CM

## 2019-09-26 DIAGNOSIS — R29.898 WEAKNESS OF BOTH LOWER EXTREMITIES: ICD-10-CM

## 2019-09-26 DIAGNOSIS — R55 SYNCOPE AND COLLAPSE: ICD-10-CM

## 2019-09-26 DIAGNOSIS — R55 VASOVAGAL SYNCOPE: ICD-10-CM

## 2019-09-26 DIAGNOSIS — I49.9 CARDIAC ARRHYTHMIA, UNSPECIFIED CARDIAC ARRHYTHMIA TYPE: ICD-10-CM

## 2019-09-26 DIAGNOSIS — R55 SYNCOPE, UNSPECIFIED SYNCOPE TYPE: Primary | ICD-10-CM

## 2019-09-26 DIAGNOSIS — R73.9 HYPERGLYCEMIA: ICD-10-CM

## 2019-09-26 PROBLEM — E78.5 HYPERLIPIDEMIA LDL GOAL <100: Status: ACTIVE | Noted: 2019-09-26

## 2019-09-26 PROBLEM — R53.1 WEAKNESS: Status: ACTIVE | Noted: 2019-09-26

## 2019-09-26 LAB
25(OH)D3 SERPL-MCNC: 43.8 NG/ML (ref 30–100)
ALBUMIN SERPL-MCNC: 4.4 G/DL (ref 3.5–5.2)
ALBUMIN/GLOB SERPL: 2 G/DL
ALP SERPL-CCNC: 67 U/L (ref 39–117)
ALT SERPL W P-5'-P-CCNC: 14 U/L (ref 1–41)
ANION GAP SERPL CALCULATED.3IONS-SCNC: 8 MMOL/L (ref 5–15)
AST SERPL-CCNC: 20 U/L (ref 1–40)
BASOPHILS # BLD AUTO: 0.04 10*3/MM3 (ref 0–0.2)
BASOPHILS NFR BLD AUTO: 0.8 % (ref 0–1.5)
BILIRUB SERPL-MCNC: 0.4 MG/DL (ref 0.2–1.2)
BUN BLD-MCNC: 15 MG/DL (ref 8–23)
BUN/CREAT SERPL: 14.9 (ref 7–25)
CALCIUM SPEC-SCNC: 8.8 MG/DL (ref 8.6–10.5)
CHLORIDE SERPL-SCNC: 99 MMOL/L (ref 98–107)
CO2 SERPL-SCNC: 30 MMOL/L (ref 22–29)
CREAT BLD-MCNC: 1.01 MG/DL (ref 0.76–1.27)
DEPRECATED RDW RBC AUTO: 41.8 FL (ref 37–54)
EOSINOPHIL # BLD AUTO: 0.1 10*3/MM3 (ref 0–0.4)
EOSINOPHIL NFR BLD AUTO: 2 % (ref 0.3–6.2)
ERYTHROCYTE [DISTWIDTH] IN BLOOD BY AUTOMATED COUNT: 12.7 % (ref 12.3–15.4)
GFR SERPL CREATININE-BSD FRML MDRD: 74 ML/MIN/1.73
GLOBULIN UR ELPH-MCNC: 2.2 GM/DL
GLUCOSE BLD-MCNC: 142 MG/DL (ref 65–99)
HCT VFR BLD AUTO: 41.7 % (ref 37.5–51)
HGB BLD-MCNC: 14.3 G/DL (ref 13–17.7)
IMM GRANULOCYTES # BLD AUTO: 0.02 10*3/MM3 (ref 0–0.05)
IMM GRANULOCYTES NFR BLD AUTO: 0.4 % (ref 0–0.5)
LYMPHOCYTES # BLD AUTO: 1.94 10*3/MM3 (ref 0.7–3.1)
LYMPHOCYTES NFR BLD AUTO: 39.2 % (ref 19.6–45.3)
MCH RBC QN AUTO: 31.7 PG (ref 26.6–33)
MCHC RBC AUTO-ENTMCNC: 34.3 G/DL (ref 31.5–35.7)
MCV RBC AUTO: 92.5 FL (ref 79–97)
MONOCYTES # BLD AUTO: 0.53 10*3/MM3 (ref 0.1–0.9)
MONOCYTES NFR BLD AUTO: 10.7 % (ref 5–12)
NEUTROPHILS # BLD AUTO: 2.32 10*3/MM3 (ref 1.7–7)
NEUTROPHILS NFR BLD AUTO: 46.9 % (ref 42.7–76)
NRBC BLD AUTO-RTO: 0 /100 WBC (ref 0–0.2)
PLATELET # BLD AUTO: 224 10*3/MM3 (ref 140–450)
PMV BLD AUTO: 10.5 FL (ref 6–12)
POTASSIUM BLD-SCNC: 3.9 MMOL/L (ref 3.5–5.2)
PROT SERPL-MCNC: 6.6 G/DL (ref 6–8.5)
RBC # BLD AUTO: 4.51 10*6/MM3 (ref 4.14–5.8)
SODIUM BLD-SCNC: 137 MMOL/L (ref 136–145)
WBC NRBC COR # BLD: 4.95 10*3/MM3 (ref 3.4–10.8)

## 2019-09-26 PROCEDURE — 83036 HEMOGLOBIN GLYCOSYLATED A1C: CPT | Performed by: NURSE PRACTITIONER

## 2019-09-26 PROCEDURE — 93000 ELECTROCARDIOGRAM COMPLETE: CPT | Performed by: NURSE PRACTITIONER

## 2019-09-26 PROCEDURE — 80053 COMPREHEN METABOLIC PANEL: CPT | Performed by: NURSE PRACTITIONER

## 2019-09-26 PROCEDURE — 99213 OFFICE O/P EST LOW 20 MIN: CPT | Performed by: NURSE PRACTITIONER

## 2019-09-26 PROCEDURE — 81003 URINALYSIS AUTO W/O SCOPE: CPT | Performed by: NURSE PRACTITIONER

## 2019-09-26 PROCEDURE — 99214 OFFICE O/P EST MOD 30 MIN: CPT | Performed by: NURSE PRACTITIONER

## 2019-09-26 PROCEDURE — 82306 VITAMIN D 25 HYDROXY: CPT | Performed by: NURSE PRACTITIONER

## 2019-09-26 PROCEDURE — 85025 COMPLETE CBC W/AUTO DIFF WBC: CPT | Performed by: NURSE PRACTITIONER

## 2019-09-26 RX ORDER — LOSARTAN POTASSIUM 100 MG/1
100 TABLET ORAL DAILY
Qty: 30 TABLET | Refills: 5 | Status: SHIPPED | OUTPATIENT
Start: 2019-09-26 | End: 2019-11-05 | Stop reason: SDUPTHER

## 2019-09-26 NOTE — PROGRESS NOTES
"Chief Complaint   Patient presents with   • Dizziness     SAME DAY         HPI   Kendell presents today for dizziness. Wife at bedside.    Dizziness  Chronic.  Waxing and waning.  Worsening over the last month.     2 years ago  Summer [place] heat- was mowing yard- very hot  Legs got weak  Thought he didn't eat enough  Kept working and bent over   Syncope and collapse  Cooled off and was fine    Jan 2019- [place] on boat 3-4 nights  3rd night- very bad claustrophobic attack- total panic and couldn't breathe  Mean dog- had been bit twice- was scared  Heat was turned up- got very hot  Even after getting off boat- woke up in middle of night still having panic attack  - has happened 2-3 times since     Spring 2019- had same feeling of weakness when mowing  [place] something was wrong but just went to sit down and rest.    1.5 weeks ago[place]  Very hot outside but didn't seem to bother him  Legs gave out and syncope and collapse  Got up- didn't feel well but was able to get up and cool down and felt better    Walking on treadmill this morning  weakness and dizziness  Quit treadmill and rode bike for 10 minutes and then weakness returned  Drove home. Did not feel safe enough to drive to office today= wife drove.    Multiple times over the last 2 years- bending over and felt dizziness but resolves     \"10-15 years ago had a bout with vertigo but this is different\"    Denies chest pain or tightness, diaphoresis, headache with visual changes, shortness of breath, or change in mental status.      Stress test 20 years ago in [place] for abnormal EKG  Ran on treadmill and felt fine.  Cards said \"EKG was abnormal but normal for you\"  No meds.    Mother has cardiology hx  Bypasses- multiple    Hydration:  Drinks 1 bottle water for breakfast  1 bottle during the day  1 bottle flavored water at night  Drinks other things throughout the day- tries to stay hydrated.    Had kidney stones years ago  Urine is " "clear    HTN  Currently taking losartan-HCTZ 100-25 mg daily. Does not regularly check BP at home. Has lost about 10 lbs in the last year. \"Hates the HCTZ\" because he is constantly urinating.        Review of Systems   Constitutional: Negative for activity change, fatigue, unexpected weight gain and unexpected weight loss.   HENT: Negative for congestion.    Eyes: Negative for blurred vision and visual disturbance.   Respiratory: Negative for chest tightness, shortness of breath and wheezing.    Cardiovascular: Negative for chest pain, palpitations and leg swelling.   Gastrointestinal: Negative for abdominal pain, constipation, diarrhea, nausea, vomiting and GERD.   Endocrine: Negative for polydipsia, polyphagia and polyuria.   Genitourinary: Positive for frequency (on diuretic).   Musculoskeletal: Negative for arthralgias.   Neurological: Positive for dizziness, syncope and weakness. Negative for light-headedness, headache and confusion.   Psychiatric/Behavioral: Negative for depressed mood and stress. The patient is nervous/anxious.         Current Outpatient Medications on File Prior to Visit   Medication Sig Dispense Refill   • Cholecalciferol (VITAMIN D PO) Take 2 tablets by mouth Daily.     • [DISCONTINUED] docusate sodium (COLACE) 50 MG capsule Take 2 capsules by mouth 3 (Three) Times a Day As Needed for Constipation. 90 capsule 2   • [DISCONTINUED] losartan-hydrochlorothiazide (HYZAAR) 100-25 MG per tablet Take 1 tablet by mouth Daily. 90 tablet 1   • [DISCONTINUED] phenylephrine-mineral oil-petrolatum 0.25-14-74.9 % ointment hemorrhoidal ointment Insert  into the rectum.     • [DISCONTINUED] polyethylene glycol (MIRALAX) packet Take 17 g by mouth Daily. 100 each 11   • [DISCONTINUED] [facility] HEMORRHOIDAL 1-0.25-14.4-15 % cream INSERT INTO RECTUM TWO TIMES DAILY AS NEEDED FOR HEMORRHOIDS  3   • [DISCONTINUED] hydrocortisone (ANUSOL-HC) 2.5 % rectal cream Insert  into the rectum 2 (Two) Times a Day. 28.35 g 11 " "    No current facility-administered medications on file prior to visit.        Allergies   Allergen Reactions   • Penicillins Rash       Past Medical History:   Diagnosis Date   • Histoplasmosis     as a child   • Hypertension    • Kidney stones    • Melanoma (CMS/HCC)         Past Surgical History:   Procedure Laterality Date   • SKIN CANCER EXCISION     • TONSILLECTOMY          Family History   Problem Relation Age of Onset   • Liver disease Father    • Hypertension Mother         Social History     Socioeconomic History   • Marital status:      Spouse name: Not on file   • Number of children: 2   • Years of education: Not on file   • Highest education level: Not on file   Tobacco Use   • Smoking status: Never Smoker   • Smokeless tobacco: Never Used   Substance and Sexual Activity   • Alcohol use: Yes     Alcohol/week: 0.6 oz     Types: 1 Cans of beer per week     Comment: ocassional   • Drug use: No   • Sexual activity: Yes     Partners: Female     Birth control/protection: None        Visit Vitals  /60 (BP Location: Right arm, Patient Position: Sitting, Cuff Size: Adult)   Temp 97.7 °F (36.5 °C) (Temporal)   Ht 175.3 cm (69\")   Wt 80.6 kg (177 lb 9.6 oz)   BMI 26.23 kg/m²          Physical Exam   Constitutional: He is oriented to person, place, and time. Vital signs are normal. He appears well-developed and well-nourished.   HENT:   Head: Normocephalic.   Right Ear: Hearing, tympanic membrane, external ear and ear canal normal.   Left Ear: Hearing, tympanic membrane, external ear and ear canal normal.   Nose: Nose normal.   Mouth/Throat: Oropharynx is clear and moist.   Eyes: Conjunctivae, EOM and lids are normal. Pupils are equal, round, and reactive to light.   Neck: No JVD present. Carotid bruit is not present. No thyromegaly present.   Cardiovascular: Normal rate, normal heart sounds, intact distal pulses and normal pulses. An irregular rhythm present.   No murmur heard.  Pulmonary/Chest: " Effort normal and breath sounds normal.   Abdominal: Soft. Bowel sounds are normal.   Musculoskeletal: Normal range of motion.     Vascular Status -  His right foot exhibits normal foot vasculature  and no edema. His left foot exhibits normal foot vasculature  and no edema.  Lymphadenopathy:        Head (right side): No submental, no submandibular, no tonsillar, no preauricular, no posterior auricular and no occipital adenopathy present.        Head (left side): No submental, no submandibular, no tonsillar, no preauricular, no posterior auricular and no occipital adenopathy present.     He has no cervical adenopathy.   Neurological: He is alert and oriented to person, place, and time. He has normal strength. No cranial nerve deficit or sensory deficit. Gait normal. GCS eye subscore is 4. GCS verbal subscore is 5. GCS motor subscore is 6.   Skin: Skin is warm, dry and intact. Turgor is normal.   Psychiatric: He has a normal mood and affect. His speech is normal and behavior is normal. Judgment and thought content normal.   Nursing note and vitals reviewed.      Results for orders placed or performed in visit on 03/29/19   Comprehensive Metabolic Panel   Result Value Ref Range    Glucose 101 (H) 70 - 100 mg/dL    BUN 19 9 - 23 mg/dL    Creatinine 1.00 0.60 - 1.30 mg/dL    Sodium 139 132 - 146 mmol/L    Potassium 3.9 3.5 - 5.5 mmol/L    Chloride 102 99 - 109 mmol/L    CO2 28.0 20.0 - 31.0 mmol/L    Calcium 9.1 8.7 - 10.4 mg/dL    Total Protein 6.8 5.7 - 8.2 g/dL    Albumin 4.23 3.20 - 4.80 g/dL    ALT (SGPT) 22 7 - 40 U/L    AST (SGOT) 22 0 - 33 U/L    Alkaline Phosphatase 66 25 - 100 U/L    Total Bilirubin 0.5 0.3 - 1.2 mg/dL    eGFR Non African Amer 75 >60 mL/min/1.73    Globulin 2.6 gm/dL    A/G Ratio 1.6 1.5 - 2.5 g/dL    BUN/Creatinine Ratio 19.0 7.0 - 25.0    Anion Gap 9.0 3.0 - 11.0 mmol/L   Lipid panel   Result Value Ref Range    Total Cholesterol 201 (H) 0 - 200 mg/dL    Triglycerides 133 0 - 150 mg/dL    HDL  Cholesterol 46 40 - 60 mg/dL    LDL Cholesterol  149 (H) 0 - 130 mg/dL   CBC Auto Differential   Result Value Ref Range    WBC 4.33 3.50 - 10.80 10*3/mm3    RBC 4.76 4.20 - 5.76 10*6/mm3    Hemoglobin 15.3 13.1 - 17.5 g/dL    Hematocrit 44.1 38.9 - 50.9 %    MCV 92.6 80.0 - 99.0 fL    MCH 32.1 (H) 27.0 - 31.0 pg    MCHC 34.7 32.0 - 36.0 g/dL    RDW 12.6 11.3 - 14.5 %    RDW-SD 41.7 37.0 - 54.0 fl    MPV 10.0 6.0 - 12.0 fL    Platelets 213 150 - 450 10*3/mm3    Neutrophil % 35.2 (L) 41.0 - 71.0 %    Lymphocyte % 49.4 (H) 24.0 - 44.0 %    Monocyte % 11.5 0.0 - 12.0 %    Eosinophil % 3.0 0.0 - 3.0 %    Basophil % 0.9 0.0 - 1.0 %    Immature Grans % 0.2 0.0 - 0.6 %    Neutrophils, Absolute 1.52 1.50 - 8.30 10*3/mm3    Lymphocytes, Absolute 2.14 0.60 - 4.80 10*3/mm3    Monocytes, Absolute 0.50 0.00 - 1.00 10*3/mm3    Eosinophils, Absolute 0.13 0.00 - 0.30 10*3/mm3    Basophils, Absolute 0.04 0.00 - 0.20 10*3/mm3    Immature Grans, Absolute 0.01 0.00 - 0.05 10*3/mm3        ECG 12 Lead  Date/Time: 9/26/2019 2:48 PM  Performed by: Kaylee Trotter APRN  Authorized by: Kaylee Trotter APRN   Comparison: compared with previous ECG from 3/29/2019  Similar to previous ECG  Rhythm: sinus rhythm  Rate: normal  BPM: 66  Conduction: left anterior fascicular block  ST Segments: ST segments normal  T Waves: T waves normal  QRS axis: indeterminate  Other: no other findings    Clinical impression: abnormal EKG  Comments: No cardiac sxs with previous EKG were identified.          Kendell was seen today for dizziness.    Diagnoses and all orders for this visit:    Syncope and collapse  Dizziness  -     CBC & Differential; Future  -     Comprehensive Metabolic Panel; Future  -     ECG 12 Lead  -     Ambulatory Referral to [facility]    Vitamin D deficiency  -     Vitamin D 25 Hydroxy; Future    Essential hypertension  -     losartan (COZAAR) 100 MG tablet; Take 1 tablet by mouth Daily.  -Instructed Kendell to keep log  of blood pressures at home over the next 2 weeks.  -Educated on DASH diet and implementing an exercise regimen.  -Instructed to seek emergent treatment if chest pain or tightness, diaphoresis, headache with visual changes, shortness of breath, or change in mental status occur.    Anemia, unspecified type  -     Iron Profile; Future    Weakness of both lower extremities  Abnormal EKG  Cardiac arrhythmia, unspecified cardiac arrhythmia type  -Vasovagal vs. Other cardiac issue    Will send for further cards workup due to abnormal cardiac physical exam and abd EKG.    Return if symptoms worsen or fail to improve.

## 2019-09-26 NOTE — ASSESSMENT & PLAN NOTE
"· Obtain echocardiogram  · Agree with discontinuing hydrochlorothiazide and continue losartan 100 mg daily  · If \"spells\" continued despite adequate hydration and adjustment of blood pressure medicine will order cardiac monitor  "

## 2019-09-26 NOTE — PROGRESS NOTES
[facility] at [facility]  Cardiology Consultation Note     Kendell De La O  1950  Requesting Provider: JAIME Young  PCP: Kaylee Trotter APRN    ID:  Kendell De La O is a 69 y.o. male seen for a consultation visit regarding the following: Syncope    Chief Complaint   Patient presents with   • Dizziness     Episodes of weakness             The patient is a 69 y.o. male with no prior cardiac history but history of hypertension who is being referred by JAIME Young for syncope and abnormal EKG. the patient considers himself an otherwise healthy male with history of hypertension whose blood pressures been controlled on losartan/hydrochlorothiazide.  The patient is physically active and goes to the gym 3-4 times per week alternating between a stationary bicycle, treadmill and weightlifting.  The patient is able to be physically active without any chest pain/pressure/tightness, increased dyspnea or palpitations.  Approximately 1-1/2 weeks ago while the patient was out on the golf field he had a sudden onset of feeling weak in his legs like he may would pass out and he actually fell down but did not completely lose consciousness.  After a few minutes he was able to stand up and continue on.  This morning while at the gym he was on the treadmill when the same symptoms hit him he began to feel weakness in his legs and lightheaded and felt he may would pass out.  He got off the treadmill rested for a few minutes then got on the stationary bicycle.  His symptoms started to return so he decided to go to his primary care provider and seek a medical evaluation.  A EKG was obtained at the PCPs office but we do not have it available.  EKG performed in our office shows sinus rhythm with PVCs and left anterior fascicular block.  For the past couple of weeks the patient had been in his usual state of health without any limitations.  His wife is concerned about a couple of episodes that she refers to his  "\"panic attacks\" that occurred a couple of months ago.  There were on 2 occasions the patient would wake up and feel as if he did not know where he was at and would feel somewhat claustrophobic. His recent blood work showed an elevated DL but he does not take any cholesterol medicine.   The patient is  and resides in [place], [place].  He and his wife relocated from [place] after senior living.  He denies any tobacco abuse, illicit drugs or alcohol use.  He reports his mother had bypass in her 70s and required a pacemaker from atrial fibrillation in her 60s.  His dad had no cardiac issues and his 2 sisters have no cardiac issues.      Past Medical History, Past Surgical History, Family history, Social History, and Medications were all reviewed with the patient today and updated as necessary.       Current Outpatient Medications:   •  Cholecalciferol (VITAMIN D PO), Take 2 tablets by mouth Daily., Disp: , Rfl:   •  losartan (COZAAR) 100 MG tablet, Take 1 tablet by mouth Daily., Disp: 30 tablet, Rfl: 5    Allergies   Allergen Reactions   • Penicillins Rash         Past Medical History:   Diagnosis Date   • Histoplasmosis     as a child   • Hypertension    • Kidney stones    • Melanoma (CMS/HCC)      Past Surgical History:   Procedure Laterality Date   • SKIN CANCER EXCISION     • TONSILLECTOMY     • VASECTOMY       Family History   Problem Relation Age of Onset   • Liver disease Father    • Hypertension Mother    • Heart disease Mother    • Arrhythmia Mother      Social History     Tobacco Use   • Smoking status: Never Smoker   • Smokeless tobacco: Never Used   Substance Use Topics   • Alcohol use: Yes     Alcohol/week: 0.6 oz     Types: 1 Cans of beer per week     Comment: occassional       Review of Systems   Constitution: Negative for weakness and malaise/fatigue.   Eyes: Negative for vision loss in left eye and vision loss in right eye.   Cardiovascular: Negative for chest pain, dyspnea on exertion, " "near-syncope, orthopnea, palpitations, paroxysmal nocturnal dyspnea and syncope.   Musculoskeletal: Negative for myalgias.   Neurological: Negative for brief paralysis, excessive daytime sleepiness, focal weakness, numbness and paresthesias.   All other systems reviewed and are negative.              /82 (BP Location: Right arm, Patient Position: Standing)   Pulse 61   Ht 177.8 cm (70\")   Wt 80.3 kg (177 lb)   BMI 25.40 kg/m²        Physical Exam   Constitutional: He is oriented to person, place, and time. He appears well-developed and well-nourished.   HENT:   Head: Normocephalic and atraumatic.   Eyes: Pupils are equal, round, and reactive to light. No scleral icterus.   Neck: No JVD present. Carotid bruit is not present. No thyromegaly present.   Cardiovascular: Normal rate and regular rhythm. Exam reveals no gallop.   No murmur heard.  Pulmonary/Chest: Effort normal and breath sounds normal.   Abdominal: Soft. He exhibits no distension. There is no hepatosplenomegaly.   Musculoskeletal: He exhibits no edema.   Neurological: He is alert and oriented to person, place, and time.   Skin: Skin is warm and dry.   Psychiatric: He has a normal mood and affect. His behavior is normal.           ECG 12 Lead  Date/Time: 9/26/2019 4:12 PM  Performed by: Rima Petit APRN  Authorized by: Rima Petit APRN   Comparison: compared with previous ECG from 3/29/2019  Similar to previous ECG  Comparison to previous ECG: Basically unchanged from prior echocardiogram with exception of PVC  Rhythm: sinus rhythm  Ectopy: infrequent PVCs  BPM: 61  Conduction: left anterior fascicular block    Clinical impression: abnormal EKG  Comments: Normal sinus rhythm with occasional PVCs  Left anterior fascicular block  QRS duration 112 MS  QT/QTc 420/422 MS            Lab Results   Component Value Date    CHOL 201 (H) 03/29/2019    HDL 46 03/29/2019     (H) 03/29/2019          Problem List Items Addressed This Visit " "       Cardiovascular and Mediastinum    Vasovagal syncope - Primary    Overview     · 1 syncopal spell 09/2019 that occurred after being outside in the heat on the golf course for 1-1/2 hours.  · Reports other presyncopal spells with feeling weak most recent occurred this morning 9/26/2019 while on the treadmill at the gym         Current Assessment & Plan     · Obtain echocardiogram  · Agree with discontinuing hydrochlorothiazide and continue losartan 100 mg daily  · If \"spells\" continued despite adequate hydration and adjustment of blood pressure medicine will order cardiac monitor         Abnormal EKG    Overview     · EKG 9/26/2019 sinus rhythm with left anterior fascicular block and PVCs         Current Assessment & Plan     · Obtain echocardiogram         Relevant Orders    Adult Transthoracic Echo Complete W/ Cont if Necessary Per Protocol    Mixed hyperlipidemia    Current Assessment & Plan     · Calculate 10-year risk and discussed at next visit whether statin therapy recommended         PVC (premature ventricular contraction)    Overview     · Noted on EKG during clinic visit 9/26/2019         Current Assessment & Plan     · Obtain echocardiogram         Essential hypertension    Current Assessment & Plan     · Discontinue hydrochlorothiazide and continue losartan 100 mg daily  · Keep blood pressure log and bring at next visit in 4 weeks             The patient's symptoms sound to be due to vasovagal syncope and presyncope.  He has lost 10 pounds recently and his blood pressure could be dropping.  I agree with his PCP and discontinuing hydrochlorothiazide.  He should also increase hydration.  I would like to obtain an echocardiogram given he had PVCs and a left anterior fascicular block.  He should keep a blood pressure log for the next few weeks.  We will schedule follow-up in 4 weeks.  If despite the medication adjustment he continues to have ongoing symptoms he will contact us before his visit and we " can order a monitor.  We will defer a stress test as the patient has no signs or symptoms of angina or heart failure.         · For ischemic evaluation as he has no signs or symptoms of angina or heart failure  · Obtain echocardiogram for abnormal EKG with PVCs and presyncope  · Discontinue hydrochlorothiazide  · Keep blood pressure log and bring at next follow-up visit  · If patient continues to have ongoing symptoms despite medication adjustments will order monitor  Return in about 4 weeks (around 10/24/2019), or if symptoms worsen or fail to improve, for Follow-up with Dr. Collins next visit.        JAIME Cleary  09/26/19  4:09 PM

## 2019-09-26 NOTE — ASSESSMENT & PLAN NOTE
· Discontinue hydrochlorothiazide and continue losartan 100 mg daily  · Keep blood pressure log and bring at next visit in 4 weeks

## 2019-09-27 DIAGNOSIS — R73.9 HYPERGLYCEMIA: Primary | ICD-10-CM

## 2019-09-27 LAB
BILIRUB BLD-MCNC: NEGATIVE MG/DL
CLARITY, POC: CLEAR
COLOR UR: YELLOW
GLUCOSE UR STRIP-MCNC: ABNORMAL MG/DL
HBA1C MFR BLD: 5.2 % (ref 4.8–5.6)
KETONES UR QL: NEGATIVE
LEUKOCYTE EST, POC: NEGATIVE
NITRITE UR-MCNC: NEGATIVE MG/ML
PH UR: 6.5 [PH] (ref 5–8)
PROT UR STRIP-MCNC: NEGATIVE MG/DL
RBC # UR STRIP: NEGATIVE /UL
SP GR UR: 1.02 (ref 1–1.03)
UROBILINOGEN UR QL: NORMAL

## 2019-10-11 ENCOUNTER — HOSPITAL ENCOUNTER (OUTPATIENT)
Dept: CARDIOLOGY | Facility: HOSPITAL | Age: 69
Discharge: HOME | End: 2019-10-11
Admitting: NURSE PRACTITIONER

## 2019-10-11 VITALS — WEIGHT: 177 LBS | HEIGHT: 70 IN | BODY MASS INDEX: 25.34 KG/M2

## 2019-10-11 DIAGNOSIS — R94.31 ABNORMAL EKG: ICD-10-CM

## 2019-10-11 DIAGNOSIS — R55 SYNCOPE, UNSPECIFIED SYNCOPE TYPE: ICD-10-CM

## 2019-10-11 PROCEDURE — 93306 TTE W/DOPPLER COMPLETE: CPT

## 2019-10-11 PROCEDURE — 93306 TTE W/DOPPLER COMPLETE: CPT | Performed by: INTERNAL MEDICINE

## 2019-10-15 ENCOUNTER — TELEPHONE (OUTPATIENT)
Dept: CARDIOLOGY | Facility: CLINIC | Age: 69
End: 2019-10-15

## 2019-10-15 LAB
BH CV ECHO MEAS - AO ROOT AREA (BSA CORRECTED): 1.8
BH CV ECHO MEAS - AO ROOT AREA: 9.6 CM^2
BH CV ECHO MEAS - AO ROOT DIAM: 3.5 CM
BH CV ECHO MEAS - BSA(HAYCOCK): 2 M^2
BH CV ECHO MEAS - BSA: 2 M^2
BH CV ECHO MEAS - BZI_BMI: 25.4 KILOGRAMS/M^2
BH CV ECHO MEAS - BZI_METRIC_HEIGHT: 177.8 CM
BH CV ECHO MEAS - BZI_METRIC_WEIGHT: 80.3 KG
BH CV ECHO MEAS - EDV(CUBED): 115.5 ML
BH CV ECHO MEAS - EDV(MOD-SP2): 77 ML
BH CV ECHO MEAS - EDV(MOD-SP4): 121 ML
BH CV ECHO MEAS - EDV(TEICH): 111.2 ML
BH CV ECHO MEAS - EF(CUBED): 83.5 %
BH CV ECHO MEAS - EF(MOD-BP): 56 %
BH CV ECHO MEAS - EF(MOD-SP2): 57.1 %
BH CV ECHO MEAS - EF(MOD-SP4): 54.5 %
BH CV ECHO MEAS - EF(TEICH): 76.4 %
BH CV ECHO MEAS - ESV(CUBED): 19 ML
BH CV ECHO MEAS - ESV(MOD-SP2): 33 ML
BH CV ECHO MEAS - ESV(MOD-SP4): 55 ML
BH CV ECHO MEAS - ESV(TEICH): 26.3 ML
BH CV ECHO MEAS - FS: 45.2 %
BH CV ECHO MEAS - IVS/LVPW: 0.94
BH CV ECHO MEAS - IVSD: 0.93 CM
BH CV ECHO MEAS - LAD MAJOR: 4.9 CM
BH CV ECHO MEAS - LAT PEAK E' VEL: 8.2 CM/SEC
BH CV ECHO MEAS - LATERAL E/E' RATIO: 8.1
BH CV ECHO MEAS - LV DIASTOLIC VOL/BSA (35-75): 61.1 ML/M^2
BH CV ECHO MEAS - LV IVRT: 0.08 SEC
BH CV ECHO MEAS - LV MASS(C)D: 163.9 GRAMS
BH CV ECHO MEAS - LV MASS(C)DI: 82.7 GRAMS/M^2
BH CV ECHO MEAS - LV SYSTOLIC VOL/BSA (12-30): 27.8 ML/M^2
BH CV ECHO MEAS - LVIDD: 4.9 CM
BH CV ECHO MEAS - LVIDS: 2.7 CM
BH CV ECHO MEAS - LVLD AP2: 7.9 CM
BH CV ECHO MEAS - LVLD AP4: 8.8 CM
BH CV ECHO MEAS - LVLS AP2: 6.3 CM
BH CV ECHO MEAS - LVLS AP4: 7.1 CM
BH CV ECHO MEAS - LVOT AREA (M): 4.2 CM^2
BH CV ECHO MEAS - LVOT AREA: 4 CM^2
BH CV ECHO MEAS - LVOT DIAM: 2.3 CM
BH CV ECHO MEAS - LVPWD: 0.99 CM
BH CV ECHO MEAS - MED PEAK E' VEL: 8.1 CM/SEC
BH CV ECHO MEAS - MEDIAL E/E' RATIO: 8.3
BH CV ECHO MEAS - MV A MAX VEL: 55.8 CM/SEC
BH CV ECHO MEAS - MV DEC TIME: 0.19 SEC
BH CV ECHO MEAS - MV E MAX VEL: 66.6 CM/SEC
BH CV ECHO MEAS - MV E/A: 1.2
BH CV ECHO MEAS - PA ACC SLOPE: 537.5 CM/SEC^2
BH CV ECHO MEAS - PA ACC TIME: 0.13 SEC
BH CV ECHO MEAS - PA PR(ACCEL): 22.8 MMHG
BH CV ECHO MEAS - PI END-D VEL: 86 CM/SEC
BH CV ECHO MEAS - PULM A REVS VEL: 26.8 CM/SEC
BH CV ECHO MEAS - PULM DIAS VEL: 32.5 CM/SEC
BH CV ECHO MEAS - PULM S/D: 1.8
BH CV ECHO MEAS - PULM SYS VEL: 58.5 CM/SEC
BH CV ECHO MEAS - RAP SYSTOLE: 3 MMHG
BH CV ECHO MEAS - RVSP: 34 MMHG
BH CV ECHO MEAS - SI(CUBED): 48.7 ML/M^2
BH CV ECHO MEAS - SI(MOD-SP2): 22.2 ML/M^2
BH CV ECHO MEAS - SI(MOD-SP4): 33.3 ML/M^2
BH CV ECHO MEAS - SI(TEICH): 42.9 ML/M^2
BH CV ECHO MEAS - SV(CUBED): 96.5 ML
BH CV ECHO MEAS - SV(MOD-SP2): 44 ML
BH CV ECHO MEAS - SV(MOD-SP4): 66 ML
BH CV ECHO MEAS - SV(TEICH): 84.9 ML
BH CV ECHO MEAS - TAPSE (>1.6): 2.8 CM2
BH CV ECHO MEAS - TR MAX PG: 31 MMHG
BH CV ECHO MEAS - TR MAX VEL: 279 CM/SEC
BH CV ECHO MEASUREMENTS AVERAGE E/E' RATIO: 8.17
BH CV VAS BP LEFT ARM: NORMAL MMHG
BH CV XLRA - RV BASE: 5.4 CM
BH CV XLRA - RV LENGTH: 9 CM
BH CV XLRA - RV MID: 3.9 CM
BH CV XLRA - TDI S': 14.5 CM/SEC
LEFT ATRIUM VOLUME INDEX: 34.8 ML/M^2
LEFT ATRIUM VOLUME: 69 ML
LV EF 2D ECHO EST: 65 %

## 2019-10-15 NOTE — TELEPHONE ENCOUNTER
Patient is calling for echo results. Are there any changes you wanted to make?    Moderate mitral valve prolapse. Mild to moderate MR.

## 2019-10-16 NOTE — TELEPHONE ENCOUNTER
Just let him know which showed mild abnormality of his mitral valve.  See if he has had any more syncopal spells.  If he has we need to order a 2-week monitor.  He has an appointment with Dr. Collins in a few weeks and he can further discuss his echo.

## 2019-11-05 ENCOUNTER — OFFICE VISIT (OUTPATIENT)
Dept: CARDIOLOGY | Facility: CLINIC | Age: 69
End: 2019-11-05

## 2019-11-05 VITALS
WEIGHT: 179.4 LBS | OXYGEN SATURATION: 91 % | SYSTOLIC BLOOD PRESSURE: 140 MMHG | BODY MASS INDEX: 26.57 KG/M2 | DIASTOLIC BLOOD PRESSURE: 90 MMHG | HEART RATE: 62 BPM | HEIGHT: 69 IN

## 2019-11-05 DIAGNOSIS — R94.31 ABNORMAL ELECTROCARDIOGRAM (ECG) (EKG): ICD-10-CM

## 2019-11-05 DIAGNOSIS — I49.3 PVC (PREMATURE VENTRICULAR CONTRACTION): ICD-10-CM

## 2019-11-05 DIAGNOSIS — I34.1 MITRAL VALVE PROLAPSE: ICD-10-CM

## 2019-11-05 DIAGNOSIS — I10 ESSENTIAL HYPERTENSION: ICD-10-CM

## 2019-11-05 DIAGNOSIS — R55 SYNCOPE, UNSPECIFIED SYNCOPE TYPE: Primary | ICD-10-CM

## 2019-11-05 PROBLEM — R42 DIZZINESS: Status: RESOLVED | Noted: 2019-09-26 | Resolved: 2019-11-05

## 2019-11-05 PROBLEM — R53.1 WEAKNESS: Status: RESOLVED | Noted: 2019-09-26 | Resolved: 2019-11-05

## 2019-11-05 PROCEDURE — 99214 OFFICE O/P EST MOD 30 MIN: CPT | Performed by: INTERNAL MEDICINE

## 2019-11-05 RX ORDER — LOSARTAN POTASSIUM 100 MG/1
100 TABLET ORAL DAILY
Qty: 90 TABLET | Refills: 3 | Status: SHIPPED | OUTPATIENT
Start: 2019-11-05

## 2019-11-05 NOTE — PROGRESS NOTES
[facility] at [facility]  Follow-up note    Encounter Date:11/05/2019    Patient ID: Kendell De La O Jr. is a  69 y.o. male who resides in [place], [place].    CC/Reason for visit:    · Near syncope  · Hypertension         Problem List Items Addressed This Visit        Cardiology Problems    Syncope - Primary    Overview     · Near syncopal spell after playing 18 holes of golf in extreme heat, 09/2019   · Near syncopal episode while exercising on a treadmill, 9/26/2019   · Echo (10/11/2019): LVEF 65%.  Mitral valve prolapse with mild to moderate MR.         Current Assessment & Plan     · Exertional nature of syncope concerning for cardiac etiology  · Exercise stress echo  · 30-day Holter monitor         Relevant Orders    Adult Stress Echo W/ Cont or Stress Agent if Necessary Per Protocol    Mobile Cardiac Outpatient Telemetry    Essential hypertension    Current Assessment & Plan     · Start metoprolol tartrate 25 mg twice daily for MVP and BP         Relevant Medications    metoprolol tartrate (LOPRESSOR) 25 MG tablet    losartan (COZAAR) 100 MG tablet    PVC (premature ventricular contraction)    Overview     · Noted on EKG during clinic visit 9/26/2019  · Echocardiogram (10/15/2019): LVEF 65%. Moderate mitral valve prolapse. Mild to moderate MR.           Relevant Medications    metoprolol tartrate (LOPRESSOR) 25 MG tablet    Mitral valve prolapse    Overview     · Echo (10/11/2019): LVEF 65%.  Mitral valve prolapse with mild to moderate MR.         Current Assessment & Plan     · Start metoprolol for MVP         Relevant Medications    metoprolol tartrate (LOPRESSOR) 25 MG tablet      Other Visit Diagnoses     Abnormal electrocardiogram (ECG) (EKG)         Relevant Orders    Adult Stress Echo W/ Cont or Stress Agent if Necessary Per Protocol               · Exercise echo  · 30-day Holter monitoring  Return in about 6 weeks (around 12/17/2019) for Follow-up with [facility] only.           "  Kendell De La O Jr. is a 69 y.o. male who returns to my office for follow-up for syncope.  The patient was seen by my nurse practitioner, Tamra Petit, in September for syncope.  The patient states that he has had no further syncopal episodes.  He does state that he has had several occasions of near loss of consciousness and leg weakness with no prodrome.  One episode occurred after playing 18 holes of golf in the heat.  Another episode occurred while exerting himself on the treadmill at the gym.  A third episode occurred with just a leisurely walk in [place].    The patient works out regularly and has had no exertional chest discomfort or palpitation symptoms.  He has not been \"going as hard\" out of concerns of these symptoms.    Review of Systems   Constitution: Negative for weakness and malaise/fatigue.   Eyes: Negative for vision loss in left eye and vision loss in right eye.   Cardiovascular: Positive for syncope. Negative for chest pain, dyspnea on exertion, near-syncope, orthopnea, palpitations and paroxysmal nocturnal dyspnea.   Musculoskeletal: Negative for myalgias.   Neurological: Negative for brief paralysis, excessive daytime sleepiness, focal weakness, numbness and paresthesias.   All other systems reviewed and are negative.      The patient's past medical, social, family history and ROS reviewed in the patient's electronic medical record.    Allergies  Penicillins    Outpatient Medications Marked as Taking for the 11/5/19 encounter (Office Visit) with Solomon Collins IV, MD   Medication Sig Dispense Refill   • Cholecalciferol (VITAMIN D PO) Take 2 tablets by mouth Daily.     • losartan (COZAAR) 100 MG tablet Take 1 tablet by mouth Daily. 90 tablet 3   • [DISCONTINUED] losartan (COZAAR) 100 MG tablet Take 1 tablet by mouth Daily. 30 tablet 5           Blood pressure 140/90, pulse 62, height 175.3 cm (69\"), weight 81.4 kg (179 lb 6.4 oz), SpO2 91 %.  Body mass index is 26.49 kg/m².  Vitals: "    11/05/19 1057   Patient Position: Sitting       Physical Exam   Constitutional: He is oriented to person, place, and time. He appears well-developed and well-nourished.   HENT:   Head: Normocephalic and atraumatic.   Eyes: Pupils are equal, round, and reactive to light. No scleral icterus.   Neck: No JVD present. Carotid bruit is not present. No thyromegaly present.   Cardiovascular: Normal rate, regular rhythm, S1 normal and S2 normal. Exam reveals no gallop.   No murmur heard.  Pulmonary/Chest: Effort normal and breath sounds normal.   Abdominal: Soft. There is no hepatosplenomegaly. There is no tenderness.   Neurological: He is alert and oriented to person, place, and time.   Skin: Skin is warm and dry. No cyanosis. Nails show no clubbing.   Psychiatric: He has a normal mood and affect. His behavior is normal.       Data Review (reviewed with patient):     Procedures    Lab Results   Component Value Date    CHOL 201 (H) 03/29/2019    TRIG 133 03/29/2019    HDL 46 03/29/2019     (H) 03/29/2019    AST 20 09/26/2019    ALT 14 09/26/2019       Lab Results   Component Value Date    HGBA1C 5.20 09/26/2019         MARIA DOLORES Collins MD [facility], [facility]  Interventional and General Cardiology

## 2019-11-05 NOTE — ASSESSMENT & PLAN NOTE
· Exertional nature of syncope concerning for cardiac etiology  · Exercise stress echo  · 30-day Holter monitor

## 2019-11-26 ENCOUNTER — HOSPITAL ENCOUNTER (OUTPATIENT)
Dept: CARDIOLOGY | Facility: HOSPITAL | Age: 69
Discharge: HOME | End: 2019-11-26
Admitting: INTERNAL MEDICINE

## 2019-11-26 VITALS
DIASTOLIC BLOOD PRESSURE: 84 MMHG | SYSTOLIC BLOOD PRESSURE: 140 MMHG | HEART RATE: 94 BPM | BODY MASS INDEX: 26.58 KG/M2 | WEIGHT: 179.45 LBS | HEIGHT: 69 IN

## 2019-11-26 DIAGNOSIS — R55 SYNCOPE, UNSPECIFIED SYNCOPE TYPE: ICD-10-CM

## 2019-11-26 DIAGNOSIS — R94.31 ABNORMAL ELECTROCARDIOGRAM (ECG) (EKG): ICD-10-CM

## 2019-11-26 PROCEDURE — 93320 DOPPLER ECHO COMPLETE: CPT | Performed by: INTERNAL MEDICINE

## 2019-11-26 PROCEDURE — 93325 DOPPLER ECHO COLOR FLOW MAPG: CPT

## 2019-11-26 PROCEDURE — 93352 ADMIN ECG CONTRAST AGENT: CPT | Performed by: INTERNAL MEDICINE

## 2019-11-26 PROCEDURE — 93350 STRESS TTE ONLY: CPT

## 2019-11-26 PROCEDURE — 25010000002 SULFUR HEXAFLUORIDE MICROSPH 60.7-25 MG RECONSTITUTED SUSPENSION: Performed by: INTERNAL MEDICINE

## 2019-11-26 PROCEDURE — 93350 STRESS TTE ONLY: CPT | Performed by: INTERNAL MEDICINE

## 2019-11-26 PROCEDURE — 93320 DOPPLER ECHO COMPLETE: CPT

## 2019-11-26 PROCEDURE — 93017 CV STRESS TEST TRACING ONLY: CPT

## 2019-11-26 PROCEDURE — 93018 CV STRESS TEST I&R ONLY: CPT | Performed by: INTERNAL MEDICINE

## 2019-11-26 PROCEDURE — 93325 DOPPLER ECHO COLOR FLOW MAPG: CPT | Performed by: INTERNAL MEDICINE

## 2019-11-26 RX ADMIN — SULFUR HEXAFLUORIDE 5 ML: KIT at 14:37

## 2019-11-27 LAB
BH CV ECHO MEAS - AO ROOT AREA (BSA CORRECTED): 1.8
BH CV ECHO MEAS - AO ROOT AREA: 9.5 CM^2
BH CV ECHO MEAS - AO ROOT DIAM: 3.5 CM
BH CV ECHO MEAS - BSA(HAYCOCK): 2 M^2
BH CV ECHO MEAS - BSA: 2 M^2
BH CV ECHO MEAS - BZI_BMI: 26.4 KILOGRAMS/M^2
BH CV ECHO MEAS - BZI_METRIC_HEIGHT: 175.3 CM
BH CV ECHO MEAS - BZI_METRIC_WEIGHT: 81.2 KG
BH CV ECHO MEAS - EDV(CUBED): 139.7 ML
BH CV ECHO MEAS - EDV(MOD-SP4): 95 ML
BH CV ECHO MEAS - EDV(TEICH): 128.8 ML
BH CV ECHO MEAS - EF(CUBED): 75.1 %
BH CV ECHO MEAS - EF(MOD-SP4): 65.3 %
BH CV ECHO MEAS - EF(TEICH): 66.6 %
BH CV ECHO MEAS - ESV(CUBED): 34.8 ML
BH CV ECHO MEAS - ESV(MOD-SP4): 33 ML
BH CV ECHO MEAS - ESV(TEICH): 43 ML
BH CV ECHO MEAS - FS: 37.1 %
BH CV ECHO MEAS - IVS/LVPW: 1
BH CV ECHO MEAS - IVSD: 0.96 CM
BH CV ECHO MEAS - LA DIMENSION: 3.5 CM
BH CV ECHO MEAS - LA/AO: 1
BH CV ECHO MEAS - LAD MAJOR: 4.7 CM
BH CV ECHO MEAS - LV DIASTOLIC VOL/BSA (35-75): 48.2 ML/M^2
BH CV ECHO MEAS - LV MASS(C)D: 181 GRAMS
BH CV ECHO MEAS - LV MASS(C)DI: 91.9 GRAMS/M^2
BH CV ECHO MEAS - LV SYSTOLIC VOL/BSA (12-30): 16.7 ML/M^2
BH CV ECHO MEAS - LVIDD: 5.2 CM
BH CV ECHO MEAS - LVIDS: 3.3 CM
BH CV ECHO MEAS - LVLD AP4: 7.6 CM
BH CV ECHO MEAS - LVLS AP4: 6.3 CM
BH CV ECHO MEAS - LVPWD: 0.94 CM
BH CV ECHO MEAS - SI(CUBED): 53.2 ML/M^2
BH CV ECHO MEAS - SI(MOD-SP4): 31.5 ML/M^2
BH CV ECHO MEAS - SI(TEICH): 43.6 ML/M^2
BH CV ECHO MEAS - SV(CUBED): 104.9 ML
BH CV ECHO MEAS - SV(MOD-SP4): 62 ML
BH CV ECHO MEAS - SV(TEICH): 85.8 ML
BH CV STRESS BP STAGE 1: NORMAL
BH CV STRESS BP STAGE 2: NORMAL
BH CV STRESS BP STAGE 3: NORMAL
BH CV STRESS DURATION MIN STAGE 1: 3
BH CV STRESS DURATION MIN STAGE 2: 3
BH CV STRESS DURATION MIN STAGE 3: 3
BH CV STRESS DURATION MIN STAGE 4: 3
BH CV STRESS DURATION SEC STAGE 1: 0
BH CV STRESS DURATION SEC STAGE 2: 0
BH CV STRESS DURATION SEC STAGE 3: 0
BH CV STRESS DURATION SEC STAGE 4: 0
BH CV STRESS GRADE STAGE 1: 10
BH CV STRESS GRADE STAGE 2: 12
BH CV STRESS GRADE STAGE 3: 14
BH CV STRESS GRADE STAGE 4: 16
BH CV STRESS HR STAGE 1: 106
BH CV STRESS HR STAGE 2: 121
BH CV STRESS HR STAGE 3: 134
BH CV STRESS HR STAGE 4: 155
BH CV STRESS METS STAGE 1: 5
BH CV STRESS METS STAGE 2: 7.5
BH CV STRESS METS STAGE 3: 10
BH CV STRESS METS STAGE 4: 13.5
BH CV STRESS O2 STAGE 1: 95
BH CV STRESS O2 STAGE 2: 97
BH CV STRESS O2 STAGE 3: 96
BH CV STRESS PROTOCOL 1: NORMAL
BH CV STRESS RECOVERY BP: NORMAL MMHG
BH CV STRESS RECOVERY HR: 94 BPM
BH CV STRESS SPEED STAGE 1: 1.7
BH CV STRESS SPEED STAGE 2: 2.5
BH CV STRESS SPEED STAGE 3: 3.4
BH CV STRESS SPEED STAGE 4: 4.2
BH CV STRESS STAGE 1: 1
BH CV STRESS STAGE 2: 2
BH CV STRESS STAGE 3: 3
BH CV STRESS STAGE 4: 4
BH CV VAS BP LEFT ARM: NORMAL MMHG
BH CV XLRA - RV BASE: 5.3 CM
BH CV XLRA - RV LENGTH: 8.5 CM
BH CV XLRA - RV MID: 3.8 CM
LV EF 2D ECHO EST: 60 %
PERCENT MAX PREDICTED HR: 101.31 %
STRESS BASELINE BP: NORMAL MMHG
STRESS BASELINE HR: 78 BPM
STRESS PERCENT HR: 119 %
STRESS POST ESTIMATED WORKLOAD: 13.4 METS
STRESS POST EXERCISE DUR MIN: 12 MIN
STRESS POST EXERCISE DUR SEC: 0 SEC
STRESS POST O2 SAT PEAK: 96 %
STRESS POST PEAK BP: NORMAL MMHG
STRESS POST PEAK HR: 155 BPM

## 2019-12-23 ENCOUNTER — TELEPHONE (OUTPATIENT)
Dept: CARDIOLOGY | Facility: CLINIC | Age: 69
End: 2019-12-23

## 2019-12-23 DIAGNOSIS — I10 ESSENTIAL HYPERTENSION: Primary | ICD-10-CM

## 2019-12-23 RX ORDER — AMLODIPINE BESYLATE 5 MG/1
5 TABLET ORAL DAILY
Qty: 90 TABLET | Refills: 3 | Status: SHIPPED | OUTPATIENT
Start: 2019-12-23

## 2019-12-23 NOTE — TELEPHONE ENCOUNTER
"I spoke with the patient and he reports that he has not had anymore spells and feels, \"fine\". He does report that his BP has been elevated at 175/90 after medication, unsure of HR.    He is on losartan 100 mg daily  Metoprolol 25 mg BID    He reports that his BP became elevated after he stopped his HCTZ back in September.   "

## 2019-12-23 NOTE — TELEPHONE ENCOUNTER
----- Message from Solomon Collins IV, MD sent at 12/23/2019  9:12 AM EST -----  Find out if he has had any recent episodes.

## 2019-12-23 NOTE — TELEPHONE ENCOUNTER
Please have the patient start amlodipine 5 mg daily..      MARIA DOLORES Collins MD [facility], [facility] [place] [facility]  Interventional and General Cardiology

## 2019-12-23 NOTE — TELEPHONE ENCOUNTER
Left message for the patient to call back to let us know how he was feeling and if he was having anymore syncopal events.

## 2020-01-14 ENCOUNTER — OFFICE VISIT (OUTPATIENT)
Dept: CARDIOLOGY | Facility: CLINIC | Age: 70
End: 2020-01-14

## 2020-01-14 VITALS
BODY MASS INDEX: 27.55 KG/M2 | SYSTOLIC BLOOD PRESSURE: 128 MMHG | OXYGEN SATURATION: 98 % | WEIGHT: 186 LBS | HEIGHT: 69 IN | HEART RATE: 58 BPM | DIASTOLIC BLOOD PRESSURE: 76 MMHG

## 2020-01-14 DIAGNOSIS — R55 VASOVAGAL SYNCOPE: Primary | ICD-10-CM

## 2020-01-14 DIAGNOSIS — I49.3 PVC (PREMATURE VENTRICULAR CONTRACTION): ICD-10-CM

## 2020-01-14 DIAGNOSIS — I10 ESSENTIAL HYPERTENSION: ICD-10-CM

## 2020-01-14 DIAGNOSIS — I34.1 MITRAL VALVE PROLAPSE: ICD-10-CM

## 2020-01-14 PROBLEM — E78.2 MIXED HYPERLIPIDEMIA: Status: RESOLVED | Noted: 2019-09-26 | Resolved: 2020-01-14

## 2020-01-14 PROCEDURE — 99214 OFFICE O/P EST MOD 30 MIN: CPT | Performed by: NURSE PRACTITIONER

## 2020-01-14 NOTE — ASSESSMENT & PLAN NOTE
· Hypertension is controlled  · Continue losartan 100 mg daily  · Continue amlodipine 5 mg daily  · Continue metoprolol tartrate 25 mg twice daily

## 2020-01-14 NOTE — ASSESSMENT & PLAN NOTE
· No further syncopal spells.    · Stress echo within normal limits  · If experiences further episodes would recommend loop recorder implant

## 2020-01-30 DIAGNOSIS — I49.3 PVC (PREMATURE VENTRICULAR CONTRACTION): ICD-10-CM
